# Patient Record
Sex: MALE | Race: WHITE | NOT HISPANIC OR LATINO | Employment: OTHER | URBAN - METROPOLITAN AREA
[De-identification: names, ages, dates, MRNs, and addresses within clinical notes are randomized per-mention and may not be internally consistent; named-entity substitution may affect disease eponyms.]

---

## 2019-01-11 RX ORDER — FLUTICASONE PROPIONATE 50 MCG
1 SPRAY, SUSPENSION (ML) NASAL DAILY
COMMUNITY
End: 2022-02-18

## 2019-01-11 RX ORDER — ARFORMOTEROL TARTRATE 15 UG/2ML
15 SOLUTION RESPIRATORY (INHALATION) 2 TIMES DAILY
COMMUNITY

## 2019-01-11 RX ORDER — FUROSEMIDE 20 MG/1
20 TABLET ORAL 2 TIMES DAILY
COMMUNITY

## 2019-01-11 RX ORDER — BUDESONIDE 0.5 MG/2ML
0.5 INHALANT ORAL DAILY
COMMUNITY

## 2019-01-11 RX ORDER — ALBUTEROL SULFATE 1.25 MG/3ML
1.25 SOLUTION RESPIRATORY (INHALATION) EVERY 6 HOURS PRN
COMMUNITY
End: 2020-01-15

## 2019-01-11 RX ORDER — POTASSIUM CHLORIDE 750 MG/1
10 CAPSULE, EXTENDED RELEASE ORAL DAILY
COMMUNITY

## 2019-01-14 ENCOUNTER — OFFICE VISIT (OUTPATIENT)
Dept: PULMONOLOGY | Facility: CLINIC | Age: 79
End: 2019-01-14
Payer: MEDICARE

## 2019-01-14 VITALS
WEIGHT: 196 LBS | HEART RATE: 94 BPM | HEIGHT: 68 IN | DIASTOLIC BLOOD PRESSURE: 75 MMHG | SYSTOLIC BLOOD PRESSURE: 130 MMHG | BODY MASS INDEX: 29.7 KG/M2 | OXYGEN SATURATION: 98 %

## 2019-01-14 DIAGNOSIS — J96.11 CHRONIC HYPOXEMIC RESPIRATORY FAILURE: ICD-10-CM

## 2019-01-14 DIAGNOSIS — J44.9 CHRONIC OBSTRUCTIVE PULMONARY DISEASE, UNSPECIFIED COPD TYPE: ICD-10-CM

## 2019-01-14 PROCEDURE — 99214 PR OFFICE/OUTPT VISIT, EST, LEVL IV, 30-39 MIN: ICD-10-PCS | Mod: ,,, | Performed by: INTERNAL MEDICINE

## 2019-01-14 PROCEDURE — 99214 OFFICE O/P EST MOD 30 MIN: CPT | Mod: ,,, | Performed by: INTERNAL MEDICINE

## 2019-01-14 NOTE — PATIENT INSTRUCTIONS
Chronic Lung Disease: Preventing Lung Infections  Chronic lung diseases include chronic obstructive pulmonary disease (COPD), which includes chronic bronchitis and emphysema. Other chronic lung diseases include pulmonary fibrosis, sarcoidosis, and other conditions. When you have chronic lung diseases, it's very important to protect yourself from respiratory infections, like colds, the flu, and lung infections. Infections may cause your lung condition to worsen. Although you can't completely avoid them, there are things you can do to lessen the chance of infections.    Take precautions  Taking the following precautions can help you avoid illness:  · Remember to keep your hands away from your nose and mouth. Germs on your hands get into your respiratory system this way.  · Wash your hands often. When you wash them:  ¨ Use soap and warm water.  ¨ Rub your hands together well for at least 20 seconds.  ¨ Make sure to rinse them well.  ¨ Dry your hands on clean towels or air-dry them.  · Use hand  containing alcohol, if you are unable to wash your hands. Use the  after touching doorknobs, handles, and supermarket carts, for example, since lots of people touch them. Then wash your hands as soon as you can.  · To help prevent the flu, get a flu vaccination every year. This may be given at your healthcare provider's office, a drugstore, or pharmacy, or at work. Get your flu shot as soon as the vaccines are available in your area. This is usually around September each year.  · To help prevent pneumococcal pneumonia, get pneumonia vaccinations. Talk with your healthcare provider about which pneumococcal vaccinations you need.  · Try to stay away from people with respiratory infections, such as colds or the flu. Stay away from crowded places, like shopping centers or movie theatres during cold and flu season.  · If you smoke, think about quitting. In addition to causing or worsening many lung conditions, the  lung damage from smoking increases your risk of infections. Stay away from others who smoke, too. This is also harmful and increases your chance of infections.  Date Last Reviewed: 4/14/2016  © 1531-7054 [employer]. [address] [place] [address], [place] [address]. All rights reserved. This information is not intended as a substitute for professional medical care. Always follow your healthcare professional's instructions.    Continue the Brovana, Budesonide and Incruse  Call if you get sick  PFTs before next visit   Refill Combivent, only use as needed

## 2019-01-14 NOTE — PROGRESS NOTES
SUBJECTIVE:    Patient ID: Sridhar Florentino is a 78 y.o. male.    Chief Complaint: COPD    HPI   Patient here today using Brovana and budesonide twice a day, and Incruse daily.  He is wearing his oxygen all the time.  He is also using albuterol via nebulizer 3 times a day.  He is not taking the Lasix every day because he is not having swelling anymore like he was.  He is out of his Combivent.  He is not able to do much secondary to his dyspnea.    Past Medical History:   Diagnosis Date    Chronic back pain     COPD (chronic obstructive pulmonary disease)     Hypoxemia      Past Surgical History:   Procedure Laterality Date    bilateral cataracts      LUMBAR DISC SURGERY       Family History   Problem Relation Age of Onset    Cancer Mother     Heart failure Father     Suicide Brother         Social History:   Marital Status:   Occupation:  Retired nuclear med tech   Alcohol History:  reports that he does not drink alcohol.  Tobacco History:  reports that he quit smoking about 10 years ago. His smoking use included cigarettes. He has a 40.00 pack-year smoking history. He does not have any smokeless tobacco history on file.  Drug History:  reports that he does not use drugs.    Review of patient's allergies indicates:  No Known Allergies    Current Outpatient Medications   Medication Sig Dispense Refill    albuterol (ACCUNEB) 1.25 mg/3 mL Nebu Take 1.25 mg by nebulization every 6 (six) hours as needed. Rescue      arformoterol (BROVANA) 15 mcg/2 mL Nebu Take 15 mcg by nebulization 2 (two) times daily. Controller      budesonide (PULMICORT) 0.5 mg/2 mL nebulizer solution Take 0.5 mg by nebulization once daily. Controller      fluticasone (FLONASE) 50 mcg/actuation nasal spray 1 spray by Each Nare route once daily.      furosemide (LASIX) 20 MG tablet Take 20 mg by mouth 2 (two) times daily.      ipratropium-albuterol (COMBIVENT RESPIMAT)  mcg/actuation inhaler Inhale 1 puff into the lungs  "every 6 (six) hours as needed for Wheezing. Rescue      potassium chloride (MICRO-K) 10 MEQ CpSR Take 10 mEq by mouth once.      umeclidinium (INCRUSE ELLIPTA) 62.5 mcg/actuation DsDv Inhale into the lungs. Controller      ipratropium-albuterol (COMBIVENT)  mcg/actuation inhaler Inhale 1 puff into the lungs every 6 (six) hours as needed for Wheezing. Rescue 1 Package 6     No current facility-administered medications for this visit.        Alpha-1 Antitrypsin: MM  Last PFT: 02/2018  Last CT:02/2018    Review of Systems  General: good and bad days, only has good days when the humidity is down  Eyes: Vision is good.  ENT:  No sinusitis or pharyngitis.   Heart:: chest pain or palpitations when he feels short of breath  Lungs: dyspnea with little activity, productive cough with thick discolored mucous, orthopnea   GI: No Nausea, vomiting, constipation, diarrhea, or reflux.  : No dysuria, hesitancy, or nocturia.  Musculoskeletal: No joint pain or myalgias.  Skin: No lesions or rashes.  Neuro:headaches last several mornings  Lymph: not having swelling like he was   Psych: No anxiety or depression.  Endo: weight is stable     OBJECTIVE:      /75 (BP Location: Left arm, Patient Position: Sitting)   Pulse 94   Ht 5' 8" (1.727 m)   Wt 88.9 kg (196 lb)   SpO2 98% Comment: 3lpm  BMI 29.80 kg/m²     Physical Exam  GENERAL: Older patient in no distress.  HEENT: Pupils equal and reactive. Extraocular movements intact. Nose intact.      Pharynx moist.  NECK: Supple.   HEART: Regular rate and rhythm. No murmur or gallop auscultated.  LUNGS: Clear to auscultation and percussion. Lung excursion symmetrical. No change in fremitus. No adventitial noises.  ABDOMEN: Bowel sounds present. Non-tender, no masses palpated.  EXTREMITIES: Normal muscle tone and joint movement, no cyanosis or clubbing.   LYMPHATICS: No adenopathy palpated, no edema.  SKIN: Dry, intact, no lesions.   NEURO: Cranial nerves II-XII intact. " Motor strength 5/5 bilaterally, upper and lower extremities.  PSYCH: Appropriate affect.    Assessment:       1. Chronic obstructive pulmonary disease, unspecified COPD type    2. Chronic hypoxemic respiratory failure          Plan:       Chronic obstructive pulmonary disease, unspecified COPD type    Chronic hypoxemic respiratory failure    Other orders  -     ipratropium-albuterol (COMBIVENT)  mcg/actuation inhaler; Inhale 1 puff into the lungs every 6 (six) hours as needed for Wheezing. Rescue  Dispense: 1 Package; Refill: 6      Continue the Brovana, Budesonide and Incruse  Call if you get sick  PFTs before next visit   Refill combivent, only use as needed   Follow-up in about 6 months (around 7/14/2019).

## 2019-01-23 ENCOUNTER — TELEPHONE (OUTPATIENT)
Dept: PULMONOLOGY | Facility: CLINIC | Age: 79
End: 2019-01-23

## 2019-01-23 NOTE — TELEPHONE ENCOUNTER
----- Message from Shawn Hurtado MA sent at 1/22/2019  4:42 PM CST -----  Was he set up for PFTS before his next apt

## 2019-03-18 ENCOUNTER — CLINICAL SUPPORT (OUTPATIENT)
Dept: PULMONOLOGY | Facility: CLINIC | Age: 79
End: 2019-03-18
Payer: MEDICARE

## 2019-03-18 DIAGNOSIS — Z00.8 ENCOUNTER FOR PULMONARY FUNCTION TESTING: Primary | ICD-10-CM

## 2019-03-18 PROCEDURE — 94727 PR PULM FUNCTION TEST BY GAS: ICD-10-PCS | Mod: ,,, | Performed by: INTERNAL MEDICINE

## 2019-03-18 PROCEDURE — 94060 EVALUATION OF WHEEZING: CPT | Mod: ,,, | Performed by: INTERNAL MEDICINE

## 2019-03-18 PROCEDURE — 94727 GAS DIL/WSHOT DETER LNG VOL: CPT | Mod: ,,, | Performed by: INTERNAL MEDICINE

## 2019-03-18 PROCEDURE — 94729 PR C02/MEMBANE DIFFUSE CAPACITY: ICD-10-PCS | Mod: ,,, | Performed by: INTERNAL MEDICINE

## 2019-03-18 PROCEDURE — 94729 DIFFUSING CAPACITY: CPT | Mod: ,,, | Performed by: INTERNAL MEDICINE

## 2019-03-18 PROCEDURE — 94060 PR EVAL OF BRONCHOSPASM: ICD-10-PCS | Mod: ,,, | Performed by: INTERNAL MEDICINE

## 2019-03-27 NOTE — PROGRESS NOTES
Kellie Mcgowan M.D., F.C.C.P.  Pulmonary & Critical Care Medicine    [address] [place] [address], [address]  [place] [address]  [phone]      PFT's / Spirometry Results    Patient Name: Sridhar Florentino  YOB: 1940    Date of Study: 3/18/2019    Spirometry: Very severe obstruction.    Lung Volume: Mild restriction.    Diffusion Capacity: Moderate diffusion defect.    Response to Bronchodilators: Good response to bronchodilators in the small airways.    Comments: Continued decrease in pulmonary function.        This note was electronically signed by Kellie Mcgowan MD

## 2019-06-19 ENCOUNTER — TELEPHONE (OUTPATIENT)
Dept: PULMONOLOGY | Facility: CLINIC | Age: 79
End: 2019-06-19

## 2019-07-17 ENCOUNTER — DOCUMENTATION ONLY (OUTPATIENT)
Dept: PULMONOLOGY | Facility: CLINIC | Age: 79
End: 2019-07-17

## 2019-07-17 ENCOUNTER — OFFICE VISIT (OUTPATIENT)
Dept: PULMONOLOGY | Facility: CLINIC | Age: 79
End: 2019-07-17
Payer: MEDICARE

## 2019-07-17 VITALS
SYSTOLIC BLOOD PRESSURE: 120 MMHG | OXYGEN SATURATION: 95 % | DIASTOLIC BLOOD PRESSURE: 70 MMHG | HEIGHT: 68 IN | HEART RATE: 95 BPM | WEIGHT: 196 LBS | BODY MASS INDEX: 29.7 KG/M2

## 2019-07-17 DIAGNOSIS — J96.11 CHRONIC HYPOXEMIC RESPIRATORY FAILURE: ICD-10-CM

## 2019-07-17 DIAGNOSIS — J44.9 CHRONIC OBSTRUCTIVE PULMONARY DISEASE, UNSPECIFIED COPD TYPE: Primary | ICD-10-CM

## 2019-07-17 PROCEDURE — 99214 OFFICE O/P EST MOD 30 MIN: CPT | Mod: ,,, | Performed by: NURSE PRACTITIONER

## 2019-07-17 PROCEDURE — 99214 PR OFFICE/OUTPT VISIT, EST, LEVL IV, 30-39 MIN: ICD-10-PCS | Mod: ,,, | Performed by: NURSE PRACTITIONER

## 2019-07-17 RX ORDER — HYDROCODONE BITARTRATE AND ACETAMINOPHEN 7.5; 325 MG/1; MG/1
TABLET ORAL
Refills: 0 | COMMUNITY
Start: 2019-06-14 | End: 2022-02-18

## 2019-07-17 NOTE — PROGRESS NOTES
SUBJECTIVE:    Patient ID: Sridhar Florentino is a 79 y.o. male.    Chief Complaint: COPD (needs combivent Rx)    HPI   Patient here today using Brovana and budesonide twice a day, and Incruse daily.  He is wearing his oxygen all the time.  He is using Combivent as needed.    He has been suffering from nose bleeds.  He is using humidity on his oxygen at home.  He is not taking his Lasix daily because it makes him urinate all day.   Past Medical History:   Diagnosis Date    Chronic back pain     COPD (chronic obstructive pulmonary disease)     Hypoxemia      Past Surgical History:   Procedure Laterality Date    bilateral cataracts      LUMBAR DISC SURGERY       Family History   Problem Relation Age of Onset    Cancer Mother     Heart failure Father     Suicide Brother         Social History:   Marital Status:   Occupation:  Retired nuclear med tech   Alcohol History:  reports that he does not drink alcohol.  Tobacco History:  reports that he quit smoking about 10 years ago. His smoking use included cigarettes. He has a 40.00 pack-year smoking history. He has never used smokeless tobacco.  Drug History:  reports that he does not use drugs.    Review of patient's allergies indicates:  No Known Allergies    Current Outpatient Medications   Medication Sig Dispense Refill    albuterol (ACCUNEB) 1.25 mg/3 mL Nebu Take 1.25 mg by nebulization every 6 (six) hours as needed. Rescue      arformoterol (BROVANA) 15 mcg/2 mL Nebu Take 15 mcg by nebulization 2 (two) times daily. Controller      budesonide (PULMICORT) 0.5 mg/2 mL nebulizer solution Take 0.5 mg by nebulization once daily. Controller      fluticasone (FLONASE) 50 mcg/actuation nasal spray 1 spray by Each Nare route once daily.      furosemide (LASIX) 20 MG tablet Take 20 mg by mouth 2 (two) times daily.      HYDROcodone-acetaminophen (NORCO) 7.5-325 mg per tablet TK 1 T PO Q 4 TO 6 H PRN  0    ipratropium-albuterol (COMBIVENT)  mcg/actuation  "inhaler Inhale 1 puff into the lungs every 6 (six) hours as needed for Wheezing. Rescue 1 g 6    potassium chloride (MICRO-K) 10 MEQ CpSR Take 10 mEq by mouth once.      umeclidinium (INCRUSE ELLIPTA) 62.5 mcg/actuation DsDv Inhale into the lungs. Controller       No current facility-administered medications for this visit.        Alpha-1 Antitrypsin: MM  Last PFT: 03/2019 Very severe obstruction, mild restriction, moderate diffusion defect  Last CT:02/2018    Review of Systems  General: good and bad days  Eyes: Vision is good.  ENT:  Nose bleeds   Heart:: palpitations   Lungs: dyspnea with little activity, orthopnea   GI: No Nausea, vomiting, constipation, diarrhea, or reflux.  : No dysuria, hesitancy, or nocturia.  Musculoskeletal: No joint pain or myalgias.  Skin: No lesions or rashes.  Neuro:headaches occasionally, tremor  Lymph: swelling to legs   Psych: No anxiety or depression.  Endo: weight is stable     OBJECTIVE:      /70 (BP Location: Left arm, Patient Position: Sitting, BP Method: Medium (Manual))   Pulse 95   Ht 5' 8" (1.727 m)   Wt 88.9 kg (196 lb)   SpO2 95% Comment: 3LPM PD  BMI 29.80 kg/m²     Physical Exam  GENERAL: Older patient in no distress.  HEENT: Pupils equal and reactive. Extraocular movements intact. Nose intact.      Pharynx moist.  NECK: Supple.   HEART: Regular rate and rhythm. No murmur or gallop auscultated.  LUNGS: crackles to bases . Lung excursion symmetrical. No change in fremitus. No adventitial noises.  ABDOMEN: Bowel sounds present. Non-tender, no masses palpated.  EXTREMITIES: Normal muscle tone and joint movement, no cyanosis or clubbing.   LYMPHATICS: 2+ pitting edema to legs  SKIN: Dry, intact, no lesions.   NEURO: Cranial nerves II-XII intact. Motor strength 5/5 bilaterally, upper and lower extremities.  PSYCH: Appropriate affect.    Assessment:       1. Chronic obstructive pulmonary disease, unspecified COPD type    2. Chronic hypoxemic respiratory failure  "         Plan:       Chronic obstructive pulmonary disease, unspecified COPD type  -     VARIABLE CONCENTRATION MASK FOR HOME USE    Chronic hypoxemic respiratory failure      Continue the Brovana, Budesonide and Incruse  Needs Oxymask   Elevate feet  Take your Lasix  Chest xray   Flu shot in the fall   See Dr. Lou regarding tremor   Follow up in about 6 months (around 1/17/2020).

## 2019-07-17 NOTE — PATIENT INSTRUCTIONS
Treatment for COPD    Your healthcare provider will prescribe the best treatments for your COPD.  Treatment  Recommendations include the following:  · Medicines. Some medicines help relieve symptoms when you have them. Others are taken daily to control inflammation in the lungs. Always take your medicines as prescribed. Learn the names of your medicines, as well as how and when to use them.  · Oxygen therapy. Oxygen may be prescribed if tests show that your blood contains too little oxygen.  · Smoking. If you smoke, quit. Smoking is the main cause of COPD. Quitting will help you be able to better manage your COPD. Ask your healthcare provider about ways to help you quit smoking.  · Avoiding infections. Infections, like a cold or the flu, can cause your symptoms to worsen. Try to stay away from people who are sick. Wash your hands often. And, ask your healthcare provider about vaccines for the flu and pneumonia.  Coping with shortness of breath  Coping tips include the following:  · Exercise. Try to be as active as possible. This will improve energy levels and strengthen your muscles, so you can do more.  · Breathing techniques. Ask your healthcare provider or nurse to show you how to do pursed-lip breathing.  · Balance rest and activity. Each day, try to balance rest periods with activity. For example, you might start the day with getting dressed and eating breakfast, then relax and read the paper. After that, take a brief walk. And then sit with your feet up for a while.  · Pulmonary rehabilitation. Ask your provider, or call your local hospital to find out about pulmonary rehab programs. The programs help with managing your disease, breathing techniques, exercise, support and counseling.  · Healthy eating. Eating a healthy, balanced diet and making an effort to maintain your ideal weight are important to staying as healthy as possible. Make sure you have a lot of fruit and vegetables every day, as well as  balanced portions of whole grains, lean meats and fish, and low-fat dairy products.  Date Last Reviewed: 5/1/2016  © 3699-4501 [employer]. [address] [place] [address], [place] [address]. All rights reserved. This information is not intended as a substitute for professional medical care. Always follow your healthcare professional's instructions.    Continue the Brovana, Budesonide and Incruse  Needs Oxymask   Elevate feet  Take your Lasix

## 2019-08-06 ENCOUNTER — HOSPITAL ENCOUNTER (OUTPATIENT)
Dept: RADIOLOGY | Facility: HOSPITAL | Age: 79
Discharge: HOME | End: 2019-08-06
Attending: NURSE PRACTITIONER
Payer: MEDICARE

## 2019-08-06 ENCOUNTER — TELEPHONE (OUTPATIENT)
Dept: PULMONOLOGY | Facility: CLINIC | Age: 79
End: 2019-08-06

## 2019-08-06 DIAGNOSIS — J44.9 CHRONIC OBSTRUCTIVE PULMONARY DISEASE, UNSPECIFIED COPD TYPE: ICD-10-CM

## 2019-08-06 DIAGNOSIS — J44.9 CHRONIC OBSTRUCTIVE PULMONARY DISEASE, UNSPECIFIED COPD TYPE: Primary | ICD-10-CM

## 2019-08-06 PROCEDURE — 71046 X-RAY EXAM CHEST 2 VIEWS: CPT | Mod: TC

## 2019-08-06 NOTE — TELEPHONE ENCOUNTER
Pt wants home health someone to help him around and wants help to breathe better. Would like to discuss this with you and he said he does not want to go to his PCP.

## 2019-08-23 ENCOUNTER — TELEPHONE (OUTPATIENT)
Dept: PULMONOLOGY | Facility: CLINIC | Age: 79
End: 2019-08-23

## 2019-10-30 PROBLEM — Z99.81 OXYGEN DEPENDENT: Status: ACTIVE | Noted: 2018-02-05

## 2019-10-30 PROBLEM — J84.10 GRANULOMATOUS LUNG DISEASE: Status: ACTIVE | Noted: 2019-09-03

## 2019-10-30 PROBLEM — M51.36 DDD (DEGENERATIVE DISC DISEASE), LUMBAR: Status: ACTIVE | Noted: 2018-02-05

## 2019-10-30 PROBLEM — I87.2 VENOUS INSUFFICIENCY OF BOTH LOWER EXTREMITIES: Status: ACTIVE | Noted: 2018-02-05

## 2019-10-30 PROBLEM — Z87.891 PERSONAL HISTORY OF NICOTINE DEPENDENCE: Status: ACTIVE | Noted: 2018-02-05

## 2019-10-31 ENCOUNTER — TELEPHONE (OUTPATIENT)
Dept: FAMILY MEDICINE | Facility: CLINIC | Age: 79
End: 2019-10-31

## 2019-10-31 NOTE — TELEPHONE ENCOUNTER
"SPOKE WITH PT STATES FEELING SOB YESTERDAY AND TODAY. "I FEEL LIKE DEATH WARMED OVER." NO FEVER NO OTHER SXS.  STATES FEET EDEMA X2 DAYS.  PT DID NOT SPECIFY IF THIS IS NORMAL OR NOT.  TO DR NEAL FOR REVIEW. -DN  "

## 2019-10-31 NOTE — TELEPHONE ENCOUNTER
----- Message from Libby Vaz sent at 10/30/2019  8:55 AM CDT -----  Just an FYI patient cancelled appointment this morning because he is too weak to move . Please give him a call

## 2020-01-15 ENCOUNTER — OFFICE VISIT (OUTPATIENT)
Dept: PULMONOLOGY | Facility: CLINIC | Age: 80
End: 2020-01-15
Payer: MEDICARE

## 2020-01-15 ENCOUNTER — TELEPHONE (OUTPATIENT)
Dept: PULMONOLOGY | Facility: CLINIC | Age: 80
End: 2020-01-15

## 2020-01-15 VITALS
SYSTOLIC BLOOD PRESSURE: 145 MMHG | HEART RATE: 89 BPM | DIASTOLIC BLOOD PRESSURE: 80 MMHG | BODY MASS INDEX: 29.5 KG/M2 | WEIGHT: 194 LBS | OXYGEN SATURATION: 91 %

## 2020-01-15 DIAGNOSIS — J96.11 CHRONIC HYPOXEMIC RESPIRATORY FAILURE: ICD-10-CM

## 2020-01-15 DIAGNOSIS — J44.9 CHRONIC OBSTRUCTIVE PULMONARY DISEASE, UNSPECIFIED COPD TYPE: Primary | ICD-10-CM

## 2020-01-15 PROCEDURE — 1125F PR PAIN SEVERITY QUANTIFIED, PAIN PRESENT: ICD-10-PCS | Mod: S$GLB,,, | Performed by: INTERNAL MEDICINE

## 2020-01-15 PROCEDURE — 1159F MED LIST DOCD IN RCRD: CPT | Mod: S$GLB,,, | Performed by: INTERNAL MEDICINE

## 2020-01-15 PROCEDURE — 99214 OFFICE O/P EST MOD 30 MIN: CPT | Mod: S$GLB,,, | Performed by: INTERNAL MEDICINE

## 2020-01-15 PROCEDURE — 99214 PR OFFICE/OUTPT VISIT, EST, LEVL IV, 30-39 MIN: ICD-10-PCS | Mod: S$GLB,,, | Performed by: INTERNAL MEDICINE

## 2020-01-15 PROCEDURE — 1159F PR MEDICATION LIST DOCUMENTED IN MEDICAL RECORD: ICD-10-PCS | Mod: S$GLB,,, | Performed by: INTERNAL MEDICINE

## 2020-01-15 PROCEDURE — 1125F AMNT PAIN NOTED PAIN PRSNT: CPT | Mod: S$GLB,,, | Performed by: INTERNAL MEDICINE

## 2020-01-15 RX ORDER — IPRATROPIUM BROMIDE AND ALBUTEROL SULFATE 2.5; .5 MG/3ML; MG/3ML
3 SOLUTION RESPIRATORY (INHALATION) EVERY 6 HOURS PRN
Qty: 120 VIAL | Refills: 5 | Status: SHIPPED | OUTPATIENT
Start: 2020-01-15 | End: 2021-01-14

## 2020-01-15 NOTE — PROGRESS NOTES
SUBJECTIVE:    Patient ID: Sridhar Florentino is a 79 y.o. male.    Chief Complaint: COPD (6 mo check up )    COPD        The patient is here with his breathing not doing well.  He is using Brovana and Budesonide and albuterol.  He stopped the Incruse as he did not feel benefit.  He is not using his Lasix because it makes him urinate too much.   Past Medical History:   Diagnosis Date    Chronic back pain     COPD (chronic obstructive pulmonary disease)     Hypoxemia      Past Surgical History:   Procedure Laterality Date    bilateral cataracts      LUMBAR DISC SURGERY       Family History   Problem Relation Age of Onset    Cancer Mother     Heart failure Father     Suicide Brother         Social History:   Marital Status:   Occupation:  Retired nuclear med tech   Alcohol History:  reports that he does not drink alcohol.  Tobacco History:  reports that he quit smoking about 11 years ago. His smoking use included cigarettes. He has a 40.00 pack-year smoking history. He has never used smokeless tobacco.  Drug History:  reports that he does not use drugs.    Review of patient's allergies indicates:  No Known Allergies    Current Outpatient Medications   Medication Sig Dispense Refill    arformoterol (BROVANA) 15 mcg/2 mL Nebu Take 15 mcg by nebulization 2 (two) times daily. Controller      budesonide (PULMICORT) 0.5 mg/2 mL nebulizer solution Take 0.5 mg by nebulization once daily. Controller      fluticasone (FLONASE) 50 mcg/actuation nasal spray 1 spray by Each Nare route once daily.      HYDROcodone-acetaminophen (NORCO) 7.5-325 mg per tablet TK 1 T PO Q 4 TO 6 H PRN  0    ipratropium-albuterol (COMBIVENT)  mcg/actuation inhaler Inhale 1 puff into the lungs every 6 (six) hours as needed for Wheezing. Rescue 3 Package 3    potassium chloride (MICRO-K) 10 MEQ CpSR Take 10 mEq by mouth once.      albuterol-ipratropium (DUO-NEB) 2.5 mg-0.5 mg/3 mL nebulizer solution Take 3 mLs by nebulization  every 6 (six) hours as needed for Wheezing. Rescue 120 vial 5    furosemide (LASIX) 20 MG tablet Take 20 mg by mouth 2 (two) times daily.      ipratropium-albuterol (COMBIVENT)  mcg/actuation inhaler Inhale 1 puff into the lungs every 6 (six) hours as needed for Wheezing. Rescue 1 g 6    umeclidinium (INCRUSE ELLIPTA) 62.5 mcg/actuation DsDv Inhale into the lungs. Controller       No current facility-administered medications for this visit.        Alpha-1 Antitrypsin: MM  Last PFT: 03/2019 Very severe obstruction, mild restriction, moderate diffusion defect  Last CT:02/2018    Review of Systems  General: good and bad days  Eyes: Vision is good.  ENT: epistaxis with afrin  Heart:: palpitations  Have resolved  Lungs: dyspnea with little activity, orthopnea   GI: diarrhea up to 3-4 times a day  : No dysuria, hesitancy, or nocturia.  Musculoskeletal: No joint pain or myalgias.  Skin: No lesions or rashes.  Neuro:headaches off and on, tremors  Lymph: swelling to legs   Psych: No anxiety or depression.  Endo: weight is stable     OBJECTIVE:      BP (!) 145/80 (BP Location: Left arm, Patient Position: Sitting)   Pulse 89   Wt 88 kg (194 lb)   SpO2 (!) 91% Comment: 3.0 oxygen level  BMI 29.50 kg/m²     Physical Exam  GENERAL: Older patient in no distress.  HEENT: Pupils equal and reactive. Extraocular movements intact. Nose intact. Pharynx moist.  NECK: Supple.   HEART: Regular rate and rhythm. No murmur or gallop auscultated.  LUNGS:  There are expiratory wheezes bilaterally.  The expiratory phase is twice as long as the inspiratory phase.  Overall breath sounds are diminished.. Lung excursion symmetrical. No change in fremitus.   ABDOMEN: Bowel sounds present. Non-tender, no masses palpated.  EXTREMITIES: Normal muscle tone and joint movement, no cyanosis or clubbing.   LYMPHATICS: 2+ pitting edema to legs  SKIN: Dry, intact, no lesions.   NEURO: Cranial nerves II-XII intact. Motor strength 5/5 bilaterally,  upper and lower extremities.  PSYCH:  Depressed affect    Assessment:       1. Chronic obstructive pulmonary disease, unspecified COPD type    2. Chronic hypoxemic respiratory failure          Plan:       Chronic obstructive pulmonary disease, unspecified COPD type  -     albuterol-ipratropium (DUO-NEB) 2.5 mg-0.5 mg/3 mL nebulizer solution; Take 3 mLs by nebulization every 6 (six) hours as needed for Wheezing. Rescue  Dispense: 120 vial; Refill: 5    Chronic hypoxemic respiratory failure      Can't take Daliresp secondary to diarrhea  Change albuterol to duoneb  Continue Brovana and budesonide  Don't double up on duoneb and combivent  Continue continuous oxygen  Follow up in about 6 months (around 7/15/2020).

## 2020-01-15 NOTE — TELEPHONE ENCOUNTER
----- Message from Shawn Hurtado MA sent at 1/15/2020  2:12 PM CST -----  PTs [facility]

## 2020-01-20 RX ORDER — ARFORMOTEROL TARTRATE 15 UG/2ML
15 SOLUTION RESPIRATORY (INHALATION) 2 TIMES DAILY
Qty: 180 VIAL | Refills: 6 | Status: SHIPPED | OUTPATIENT
Start: 2020-01-20 | End: 2021-01-19

## 2020-01-20 RX ORDER — BUDESONIDE 0.5 MG/2ML
0.5 INHALANT ORAL 2 TIMES DAILY
Qty: 120 ML | Refills: 6 | Status: SHIPPED | OUTPATIENT
Start: 2020-01-20 | End: 2021-01-19

## 2022-02-17 ENCOUNTER — HOSPITAL ENCOUNTER (OUTPATIENT)
Facility: HOSPITAL | Age: 82
Discharge: HOME | End: 2022-02-18
Attending: EMERGENCY MEDICINE | Admitting: STUDENT IN AN ORGANIZED HEALTH CARE EDUCATION/TRAINING PROGRAM
Payer: MEDICARE

## 2022-02-17 DIAGNOSIS — R06.02 SHORTNESS OF BREATH: ICD-10-CM

## 2022-02-17 DIAGNOSIS — J44.1 COPD EXACERBATION: Primary | ICD-10-CM

## 2022-02-17 LAB
ALBUMIN SERPL BCP-MCNC: 3.8 G/DL (ref 3.5–5.2)
ALP SERPL-CCNC: 39 U/L (ref 55–135)
ALT SERPL W/O P-5'-P-CCNC: 13 U/L (ref 10–44)
AMPHET+METHAMPHET UR QL: NEGATIVE
ANION GAP SERPL CALC-SCNC: 11 MMOL/L (ref 8–16)
APTT PPP: 30.3 SEC (ref 23.3–35.1)
AST SERPL-CCNC: 30 U/L (ref 10–40)
BACTERIA #/AREA URNS HPF: NEGATIVE /HPF
BARBITURATES UR QL SCN>200 NG/ML: NEGATIVE
BASOPHILS # BLD AUTO: 0.02 K/UL (ref 0–0.2)
BASOPHILS NFR BLD: 0.2 % (ref 0–1.9)
BENZODIAZ UR QL SCN>200 NG/ML: NEGATIVE
BILIRUB SERPL-MCNC: 0.6 MG/DL (ref 0.1–1)
BILIRUB UR QL STRIP: NEGATIVE
BNP SERPL-MCNC: 24 PG/ML (ref 0–99)
BUN SERPL-MCNC: 13 MG/DL (ref 8–23)
BZE UR QL SCN: NEGATIVE
CALCIUM SERPL-MCNC: 8.4 MG/DL (ref 8.7–10.5)
CANNABINOIDS UR QL SCN: NEGATIVE
CHLORIDE SERPL-SCNC: 100 MMOL/L (ref 95–110)
CK SERPL-CCNC: 59 U/L (ref 20–200)
CLARITY UR: CLEAR
CO2 SERPL-SCNC: 34 MMOL/L (ref 23–29)
COLOR UR: YELLOW
CREAT SERPL-MCNC: 0.6 MG/DL (ref 0.5–1.4)
CREAT UR-MCNC: 233 MG/DL (ref 23–375)
DIFFERENTIAL METHOD: ABNORMAL
EOSINOPHIL # BLD AUTO: 0.3 K/UL (ref 0–0.5)
EOSINOPHIL NFR BLD: 2.1 % (ref 0–8)
ERYTHROCYTE [DISTWIDTH] IN BLOOD BY AUTOMATED COUNT: 16.9 % (ref 11.5–14.5)
EST. GFR  (AFRICAN AMERICAN): >60 ML/MIN/1.73 M^2
EST. GFR  (NON AFRICAN AMERICAN): >60 ML/MIN/1.73 M^2
GLUCOSE SERPL-MCNC: 137 MG/DL (ref 70–110)
GLUCOSE UR QL STRIP: NEGATIVE
HCT VFR BLD AUTO: 32.6 % (ref 40–54)
HGB BLD-MCNC: 9.7 G/DL (ref 14–18)
HGB UR QL STRIP: ABNORMAL
HYALINE CASTS #/AREA URNS LPF: 5 /LPF
IMM GRANULOCYTES # BLD AUTO: 0.09 K/UL (ref 0–0.04)
IMM GRANULOCYTES NFR BLD AUTO: 0.7 % (ref 0–0.5)
INFLUENZA A, MOLECULAR: NEGATIVE
INFLUENZA B, MOLECULAR: NEGATIVE
INR PPP: 1.1
KETONES UR QL STRIP: NEGATIVE
LACTATE SERPL-SCNC: 1.3 MMOL/L (ref 0.5–1.9)
LEUKOCYTE ESTERASE UR QL STRIP: NEGATIVE
LYMPHOCYTES # BLD AUTO: 2.5 K/UL (ref 1–4.8)
LYMPHOCYTES NFR BLD: 20.5 % (ref 18–48)
MAGNESIUM SERPL-MCNC: 1.9 MG/DL (ref 1.6–2.6)
MCH RBC QN AUTO: 28.6 PG (ref 27–31)
MCHC RBC AUTO-ENTMCNC: 29.8 G/DL (ref 32–36)
MCV RBC AUTO: 96 FL (ref 82–98)
MICROSCOPIC COMMENT: ABNORMAL
MONOCYTES # BLD AUTO: 0.7 K/UL (ref 0.3–1)
MONOCYTES NFR BLD: 5.8 % (ref 4–15)
NEUTROPHILS # BLD AUTO: 8.6 K/UL (ref 1.8–7.7)
NEUTROPHILS NFR BLD: 70.7 % (ref 38–73)
NITRITE UR QL STRIP: NEGATIVE
NRBC BLD-RTO: 0 /100 WBC
OPIATES UR QL SCN: NEGATIVE
PCP UR QL SCN>25 NG/ML: NEGATIVE
PH UR STRIP: 6 [PH] (ref 5–8)
PLATELET # BLD AUTO: 234 K/UL (ref 150–450)
PMV BLD AUTO: 11 FL (ref 9.2–12.9)
POTASSIUM SERPL-SCNC: 4.2 MMOL/L (ref 3.5–5.1)
PROCALCITONIN SERPL IA-MCNC: <0.05 NG/ML (ref 0–0.5)
PROT SERPL-MCNC: 6.7 G/DL (ref 6–8.4)
PROT UR QL STRIP: ABNORMAL
PROTHROMBIN TIME: 13.3 SEC (ref 11.4–13.7)
RBC # BLD AUTO: 3.39 M/UL (ref 4.6–6.2)
RBC #/AREA URNS HPF: 10 /HPF (ref 0–4)
SARS-COV-2 RDRP RESP QL NAA+PROBE: NEGATIVE
SODIUM SERPL-SCNC: 145 MMOL/L (ref 136–145)
SP GR UR STRIP: 1.03 (ref 1–1.03)
SPECIMEN SOURCE: NORMAL
SQUAMOUS #/AREA URNS HPF: 2 /HPF
TOXICOLOGY INFORMATION: NORMAL
TROPONIN I SERPL DL<=0.01 NG/ML-MCNC: <0.03 NG/ML
URN SPEC COLLECT METH UR: ABNORMAL
UROBILINOGEN UR STRIP-ACNC: ABNORMAL EU/DL
WBC # BLD AUTO: 12.17 K/UL (ref 3.9–12.7)
WBC #/AREA URNS HPF: 7 /HPF (ref 0–5)

## 2022-02-17 PROCEDURE — 93005 ELECTROCARDIOGRAM TRACING: CPT | Performed by: SPECIALIST

## 2022-02-17 PROCEDURE — 99900031 HC PATIENT EDUCATION (STAT)

## 2022-02-17 PROCEDURE — 84145 PROCALCITONIN (PCT): CPT | Performed by: EMERGENCY MEDICINE

## 2022-02-17 PROCEDURE — 63600175 PHARM REV CODE 636 W HCPCS: Performed by: EMERGENCY MEDICINE

## 2022-02-17 PROCEDURE — 36415 COLL VENOUS BLD VENIPUNCTURE: CPT | Performed by: EMERGENCY MEDICINE

## 2022-02-17 PROCEDURE — 85730 THROMBOPLASTIN TIME PARTIAL: CPT | Performed by: EMERGENCY MEDICINE

## 2022-02-17 PROCEDURE — 84484 ASSAY OF TROPONIN QUANT: CPT | Performed by: NURSE PRACTITIONER

## 2022-02-17 PROCEDURE — 85610 PROTHROMBIN TIME: CPT | Performed by: EMERGENCY MEDICINE

## 2022-02-17 PROCEDURE — 82550 ASSAY OF CK (CPK): CPT | Performed by: NURSE PRACTITIONER

## 2022-02-17 PROCEDURE — 83880 ASSAY OF NATRIURETIC PEPTIDE: CPT | Performed by: NURSE PRACTITIONER

## 2022-02-17 PROCEDURE — 80053 COMPREHEN METABOLIC PANEL: CPT | Performed by: NURSE PRACTITIONER

## 2022-02-17 PROCEDURE — 83735 ASSAY OF MAGNESIUM: CPT | Performed by: NURSE PRACTITIONER

## 2022-02-17 PROCEDURE — 87040 BLOOD CULTURE FOR BACTERIA: CPT | Mod: 59 | Performed by: EMERGENCY MEDICINE

## 2022-02-17 PROCEDURE — 94640 AIRWAY INHALATION TREATMENT: CPT

## 2022-02-17 PROCEDURE — 99291 CRITICAL CARE FIRST HOUR: CPT | Mod: 25,CS

## 2022-02-17 PROCEDURE — 87502 INFLUENZA DNA AMP PROBE: CPT | Performed by: EMERGENCY MEDICINE

## 2022-02-17 PROCEDURE — 83605 ASSAY OF LACTIC ACID: CPT | Performed by: EMERGENCY MEDICINE

## 2022-02-17 PROCEDURE — 96375 TX/PRO/DX INJ NEW DRUG ADDON: CPT

## 2022-02-17 PROCEDURE — 94761 N-INVAS EAR/PLS OXIMETRY MLT: CPT

## 2022-02-17 PROCEDURE — 93010 ELECTROCARDIOGRAM REPORT: CPT | Mod: ,,, | Performed by: SPECIALIST

## 2022-02-17 PROCEDURE — 80307 DRUG TEST PRSMV CHEM ANLYZR: CPT | Performed by: EMERGENCY MEDICINE

## 2022-02-17 PROCEDURE — 93010 EKG 12-LEAD: ICD-10-PCS | Mod: ,,, | Performed by: SPECIALIST

## 2022-02-17 PROCEDURE — 81001 URINALYSIS AUTO W/SCOPE: CPT | Mod: 59 | Performed by: EMERGENCY MEDICINE

## 2022-02-17 PROCEDURE — 27000221 HC OXYGEN, UP TO 24 HOURS

## 2022-02-17 PROCEDURE — 85025 COMPLETE CBC W/AUTO DIFF WBC: CPT | Performed by: NURSE PRACTITIONER

## 2022-02-17 PROCEDURE — 36415 COLL VENOUS BLD VENIPUNCTURE: CPT | Performed by: NURSE PRACTITIONER

## 2022-02-17 PROCEDURE — 25000242 PHARM REV CODE 250 ALT 637 W/ HCPCS: Performed by: EMERGENCY MEDICINE

## 2022-02-17 PROCEDURE — U0002 COVID-19 LAB TEST NON-CDC: HCPCS | Performed by: NURSE PRACTITIONER

## 2022-02-17 PROCEDURE — 99900035 HC TECH TIME PER 15 MIN (STAT)

## 2022-02-17 RX ORDER — LEVALBUTEROL 1.25 MG/.5ML
1.25 SOLUTION, CONCENTRATE RESPIRATORY (INHALATION)
Status: COMPLETED | OUTPATIENT
Start: 2022-02-17 | End: 2022-02-17

## 2022-02-17 RX ORDER — BUDESONIDE 0.5 MG/2ML
0.5 INHALANT ORAL ONCE
Status: COMPLETED | OUTPATIENT
Start: 2022-02-17 | End: 2022-02-17

## 2022-02-17 RX ORDER — IPRATROPIUM BROMIDE 0.5 MG/2.5ML
0.5 SOLUTION RESPIRATORY (INHALATION) ONCE
Status: COMPLETED | OUTPATIENT
Start: 2022-02-17 | End: 2022-02-17

## 2022-02-17 RX ORDER — METHYLPREDNISOLONE SOD SUCC 125 MG
125 VIAL (EA) INJECTION
Status: COMPLETED | OUTPATIENT
Start: 2022-02-17 | End: 2022-02-17

## 2022-02-17 RX ORDER — LEVOFLOXACIN 5 MG/ML
500 INJECTION, SOLUTION INTRAVENOUS
Status: DISCONTINUED | OUTPATIENT
Start: 2022-02-18 | End: 2022-02-18 | Stop reason: HOSPADM

## 2022-02-17 RX ORDER — LEVALBUTEROL 1.25 MG/.5ML
1.25 SOLUTION, CONCENTRATE RESPIRATORY (INHALATION)
Status: COMPLETED | OUTPATIENT
Start: 2022-02-17 | End: 2022-02-18

## 2022-02-17 RX ADMIN — METHYLPREDNISOLONE SODIUM SUCCINATE 125 MG: 125 INJECTION, POWDER, FOR SOLUTION INTRAMUSCULAR; INTRAVENOUS at 08:02

## 2022-02-17 RX ADMIN — BUDESONIDE 0.5 MG: 0.5 INHALANT RESPIRATORY (INHALATION) at 08:02

## 2022-02-17 RX ADMIN — LEVALBUTEROL HYDROCHLORIDE 1.25 MG: 1.25 SOLUTION, CONCENTRATE RESPIRATORY (INHALATION) at 08:02

## 2022-02-17 RX ADMIN — IPRATROPIUM BROMIDE 0.5 MG: 0.5 SOLUTION RESPIRATORY (INHALATION) at 08:02

## 2022-02-18 VITALS
HEIGHT: 69 IN | BODY MASS INDEX: 27.75 KG/M2 | RESPIRATION RATE: 20 BRPM | DIASTOLIC BLOOD PRESSURE: 65 MMHG | HEART RATE: 105 BPM | OXYGEN SATURATION: 95 % | TEMPERATURE: 98 F | WEIGHT: 187.38 LBS | SYSTOLIC BLOOD PRESSURE: 159 MMHG

## 2022-02-18 PROBLEM — J44.1 COPD EXACERBATION: Status: RESOLVED | Noted: 2019-01-14 | Resolved: 2022-02-18

## 2022-02-18 PROBLEM — J44.1 COPD EXACERBATION: Status: ACTIVE | Noted: 2019-01-14

## 2022-02-18 LAB
ALBUMIN SERPL BCP-MCNC: 3.7 G/DL (ref 3.5–5.2)
ALP SERPL-CCNC: 39 U/L (ref 55–135)
ALT SERPL W/O P-5'-P-CCNC: 15 U/L (ref 10–44)
ANION GAP SERPL CALC-SCNC: 7 MMOL/L (ref 8–16)
AST SERPL-CCNC: 21 U/L (ref 10–40)
BASOPHILS # BLD AUTO: 0.01 K/UL (ref 0–0.2)
BASOPHILS NFR BLD: 0.1 % (ref 0–1.9)
BILIRUB SERPL-MCNC: 0.8 MG/DL (ref 0.1–1)
BUN SERPL-MCNC: 17 MG/DL (ref 8–23)
CALCIUM SERPL-MCNC: 8.8 MG/DL (ref 8.7–10.5)
CHLORIDE SERPL-SCNC: 96 MMOL/L (ref 95–110)
CO2 SERPL-SCNC: 38 MMOL/L (ref 23–29)
CREAT SERPL-MCNC: 0.8 MG/DL (ref 0.5–1.4)
DIFFERENTIAL METHOD: ABNORMAL
EOSINOPHIL # BLD AUTO: 0 K/UL (ref 0–0.5)
EOSINOPHIL NFR BLD: 0 % (ref 0–8)
ERYTHROCYTE [DISTWIDTH] IN BLOOD BY AUTOMATED COUNT: 16.4 % (ref 11.5–14.5)
EST. GFR  (AFRICAN AMERICAN): >60 ML/MIN/1.73 M^2
EST. GFR  (NON AFRICAN AMERICAN): >60 ML/MIN/1.73 M^2
GLUCOSE SERPL-MCNC: 160 MG/DL (ref 70–110)
HCT VFR BLD AUTO: 30.9 % (ref 40–54)
HGB BLD-MCNC: 9.3 G/DL (ref 14–18)
IMM GRANULOCYTES # BLD AUTO: 0.07 K/UL (ref 0–0.04)
IMM GRANULOCYTES NFR BLD AUTO: 0.6 % (ref 0–0.5)
LYMPHOCYTES # BLD AUTO: 1.3 K/UL (ref 1–4.8)
LYMPHOCYTES NFR BLD: 12.3 % (ref 18–48)
MCH RBC QN AUTO: 28.6 PG (ref 27–31)
MCHC RBC AUTO-ENTMCNC: 30.1 G/DL (ref 32–36)
MCV RBC AUTO: 95 FL (ref 82–98)
MONOCYTES # BLD AUTO: 0.1 K/UL (ref 0.3–1)
MONOCYTES NFR BLD: 0.7 % (ref 4–15)
NEUTROPHILS # BLD AUTO: 9.3 K/UL (ref 1.8–7.7)
NEUTROPHILS NFR BLD: 86.3 % (ref 38–73)
NRBC BLD-RTO: 0 /100 WBC
PLATELET # BLD AUTO: 240 K/UL (ref 150–450)
PMV BLD AUTO: 11.3 FL (ref 9.2–12.9)
POTASSIUM SERPL-SCNC: 4 MMOL/L (ref 3.5–5.1)
PROT SERPL-MCNC: 6.9 G/DL (ref 6–8.4)
RBC # BLD AUTO: 3.25 M/UL (ref 4.6–6.2)
SODIUM SERPL-SCNC: 141 MMOL/L (ref 136–145)
WBC # BLD AUTO: 10.77 K/UL (ref 3.9–12.7)

## 2022-02-18 PROCEDURE — 80053 COMPREHEN METABOLIC PANEL: CPT | Performed by: STUDENT IN AN ORGANIZED HEALTH CARE EDUCATION/TRAINING PROGRAM

## 2022-02-18 PROCEDURE — 63600175 PHARM REV CODE 636 W HCPCS: Performed by: STUDENT IN AN ORGANIZED HEALTH CARE EDUCATION/TRAINING PROGRAM

## 2022-02-18 PROCEDURE — 27000221 HC OXYGEN, UP TO 24 HOURS

## 2022-02-18 PROCEDURE — 25000242 PHARM REV CODE 250 ALT 637 W/ HCPCS: Mod: GY | Performed by: STUDENT IN AN ORGANIZED HEALTH CARE EDUCATION/TRAINING PROGRAM

## 2022-02-18 PROCEDURE — 25000242 PHARM REV CODE 250 ALT 637 W/ HCPCS: Performed by: EMERGENCY MEDICINE

## 2022-02-18 PROCEDURE — 94761 N-INVAS EAR/PLS OXIMETRY MLT: CPT

## 2022-02-18 PROCEDURE — 25000003 PHARM REV CODE 250: Performed by: STUDENT IN AN ORGANIZED HEALTH CARE EDUCATION/TRAINING PROGRAM

## 2022-02-18 PROCEDURE — 94640 AIRWAY INHALATION TREATMENT: CPT

## 2022-02-18 PROCEDURE — G0378 HOSPITAL OBSERVATION PER HR: HCPCS | Mod: CS

## 2022-02-18 PROCEDURE — 99900031 HC PATIENT EDUCATION (STAT)

## 2022-02-18 PROCEDURE — 96365 THER/PROPH/DIAG IV INF INIT: CPT

## 2022-02-18 PROCEDURE — 85025 COMPLETE CBC W/AUTO DIFF WBC: CPT | Performed by: STUDENT IN AN ORGANIZED HEALTH CARE EDUCATION/TRAINING PROGRAM

## 2022-02-18 PROCEDURE — 36415 COLL VENOUS BLD VENIPUNCTURE: CPT | Performed by: STUDENT IN AN ORGANIZED HEALTH CARE EDUCATION/TRAINING PROGRAM

## 2022-02-18 PROCEDURE — G0378 HOSPITAL OBSERVATION PER HR: HCPCS

## 2022-02-18 RX ORDER — ENOXAPARIN SODIUM 100 MG/ML
40 INJECTION SUBCUTANEOUS EVERY 24 HOURS
Status: DISCONTINUED | OUTPATIENT
Start: 2022-02-18 | End: 2022-02-18 | Stop reason: HOSPADM

## 2022-02-18 RX ORDER — FLUTICASONE FUROATE AND VILANTEROL 100; 25 UG/1; UG/1
1 POWDER RESPIRATORY (INHALATION) DAILY
Status: DISCONTINUED | OUTPATIENT
Start: 2022-02-18 | End: 2022-02-18 | Stop reason: HOSPADM

## 2022-02-18 RX ORDER — SODIUM CHLORIDE 0.9 % (FLUSH) 0.9 %
10 SYRINGE (ML) INJECTION
Status: DISCONTINUED | OUTPATIENT
Start: 2022-02-18 | End: 2022-02-18 | Stop reason: HOSPADM

## 2022-02-18 RX ORDER — PREDNISONE 20 MG/1
TABLET ORAL
Qty: 15 TABLET | Refills: 0 | Status: SHIPPED | OUTPATIENT
Start: 2022-02-18 | End: 2022-02-28

## 2022-02-18 RX ORDER — IPRATROPIUM BROMIDE AND ALBUTEROL SULFATE 2.5; .5 MG/3ML; MG/3ML
3 SOLUTION RESPIRATORY (INHALATION)
Status: DISCONTINUED | OUTPATIENT
Start: 2022-02-18 | End: 2022-02-18 | Stop reason: HOSPADM

## 2022-02-18 RX ORDER — BUDESONIDE 0.25 MG/2ML
0.25 INHALANT ORAL DAILY
Status: DISCONTINUED | OUTPATIENT
Start: 2022-02-18 | End: 2022-02-18 | Stop reason: HOSPADM

## 2022-02-18 RX ORDER — LISINOPRIL 5 MG/1
5 TABLET ORAL DAILY
Status: DISCONTINUED | OUTPATIENT
Start: 2022-02-18 | End: 2022-02-18 | Stop reason: HOSPADM

## 2022-02-18 RX ORDER — TALC
6 POWDER (GRAM) TOPICAL NIGHTLY PRN
Status: DISCONTINUED | OUTPATIENT
Start: 2022-02-18 | End: 2022-02-18 | Stop reason: HOSPADM

## 2022-02-18 RX ORDER — FUROSEMIDE 20 MG/1
20 TABLET ORAL 2 TIMES DAILY
Status: DISCONTINUED | OUTPATIENT
Start: 2022-02-18 | End: 2022-02-18 | Stop reason: HOSPADM

## 2022-02-18 RX ADMIN — FUROSEMIDE 20 MG: 20 TABLET ORAL at 09:02

## 2022-02-18 RX ADMIN — FLUTICASONE FUROATE AND VILANTEROL TRIFENATATE 1 PUFF: 100; 25 POWDER RESPIRATORY (INHALATION) at 10:02

## 2022-02-18 RX ADMIN — PREDNISONE 50 MG: 5 TABLET ORAL at 09:02

## 2022-02-18 RX ADMIN — LEVALBUTEROL HYDROCHLORIDE 1.25 MG: 1.25 SOLUTION, CONCENTRATE RESPIRATORY (INHALATION) at 12:02

## 2022-02-18 RX ADMIN — IPRATROPIUM BROMIDE AND ALBUTEROL SULFATE 3 ML: .5; 3 SOLUTION RESPIRATORY (INHALATION) at 08:02

## 2022-02-18 RX ADMIN — LEVOFLOXACIN 500 MG: 500 INJECTION, SOLUTION INTRAVENOUS at 12:02

## 2022-02-18 RX ADMIN — LISINOPRIL 5 MG: 5 TABLET ORAL at 09:02

## 2022-02-18 RX ADMIN — IPRATROPIUM BROMIDE AND ALBUTEROL SULFATE 3 ML: .5; 3 SOLUTION RESPIRATORY (INHALATION) at 02:02

## 2022-02-18 NOTE — ED NOTES
82 YOM BIB EMS c/o SOB worst with exertion that began this am. Pt stated PMH COPD no relief with inhaler. Wears 3 L nc continuous with no relief. Pt placed on 4 L nc O2 95%. Denies SOB on 4 L nc. Pt speaking in complete sentences. -CP. Bilateral lower extremity non pitting edema noted. Also c/o rectal pain secondary to hemorrhoids. Denies any other complaints.     Adult Physical Assessment  LOC: Patient is awake, alert, oriented and speaking appropriately at this time.  APPEARANCE: Patient resting comfortably and appears to be in no acute distress at this time. Patient is clean and well groomed, patient's clothing is properly fastened.  SKIN:The skin is warm and dry, color consistent with ethnicity, patient has normal skin turgor and moist mucus membranes, skin intact, no breakdown or brusing noted.  MUSCULOSKELETAL: Patient moving all extremities well, no obvious swelling or deformities noted.  RESPIRATORY: Airway is open and patent, respirations are spontaneous, patient has a normal effort and rate, no accessory muscle use noted.  CARDIAC: Patient has a normal rate and rhythm, capillary refill < 3 seconds in all extremities.  ABDOMEN: Soft and non tender to palpation, no abdominal distention noted.  NEUROLOGIC: Eyes open spontaneously, behavior appropriate to situation, follows commands, facial expression symmetrical, bilateral hand grasp equal and even, purposeful motor response noted, normal sensation in all extremities when touched with a finger.      VSS. ED workup in progress. Call light within pt reach. Safety measures in place: side rails up X2. Will continue to monitor.

## 2022-02-18 NOTE — SUBJECTIVE & OBJECTIVE
Past Medical History:   Diagnosis Date    Chronic back pain     COPD (chronic obstructive pulmonary disease)     Hypoxemia        Past Surgical History:   Procedure Laterality Date    bilateral cataracts      LUMBAR DISC SURGERY         Review of patient's allergies indicates:  No Known Allergies    No current facility-administered medications on file prior to encounter.     Current Outpatient Medications on File Prior to Encounter   Medication Sig    arformoteroL (BROVANA) 15 mcg/2 mL Nebu Take 15 mcg by nebulization 2 (two) times daily. Controller    budesonide (PULMICORT) 0.5 mg/2 mL nebulizer solution Take 0.5 mg by nebulization once daily. Controller    COMBIVENT RESPIMAT  mcg/actuation inhaler USE 1 INHALATION EVERY 6 HOURS AS NEEDED FOR WHEEZING (Patient taking differently: Inhale 1 puff into the lungs every 6 (six) hours as needed.)    fluticasone-umeclidin-vilanter (TRELEGY ELLIPTA) 100-62.5-25 mcg DsDv Inhale 1 puff into the lungs Daily.    furosemide (LASIX) 20 MG tablet Take 20 mg by mouth 2 (two) times daily.    lisinopriL (PRINIVIL,ZESTRIL) 5 MG tablet Take 5 mg by mouth once daily.    potassium chloride (MICRO-K) 10 MEQ CpSR Take 10 mEq by mouth once daily.    umeclidinium (INCRUSE ELLIPTA) 62.5 mcg/actuation inhalation capsule Inhale 62.5 mcg into the lungs once daily. Controller    albuterol (PROVENTIL) 2.5 mg /3 mL (0.083 %) nebulizer solution Take 2.5 mg by nebulization every 6 (six) hours as needed.    ipratropium-albuterol (COMBIVENT)  mcg/actuation inhaler Inhale 1 puff into the lungs every 6 (six) hours as needed for Wheezing. Rescue    [DISCONTINUED] arformoteroL (BROVANA) 15 mcg/2 mL Nebu 2 ml    [DISCONTINUED] fluocinolone (SYNALAR) 0.01 % external solution Apply behind ears bid prn for scaly itchy rash    [DISCONTINUED] fluticasone (FLONASE) 50 mcg/actuation nasal spray 1 spray by Each Nare route once daily.    [DISCONTINUED] furosemide (LASIX) 20 MG tablet 1  tablet    [DISCONTINUED] HYDROcodone-acetaminophen (NORCO) 7.5-325 mg per tablet TK 1 T PO Q 4 TO 6 H PRN    [DISCONTINUED] HYDROcodone-acetaminophen 7.5-300 mg Tab     [DISCONTINUED] ipratropium-albuterol (COMBIVENT)  mcg/actuation inhaler Inhale 1 puff into the lungs every 6 (six) hours as needed for Wheezing. Rescue    [DISCONTINUED] ketoconazole (NIZORAL) 2 % shampoo Use to wash scalp and ears qod  for scaly rash    [DISCONTINUED] lisinopriL 10 MG tablet      Family History     Problem Relation (Age of Onset)    Cancer Mother    Heart failure Father    Suicide Brother        Tobacco Use    Smoking status: Former Smoker     Packs/day: 1.00     Years: 40.00     Pack years: 40.00     Types: Cigarettes     Quit date:      Years since quittin.1    Smokeless tobacco: Never Used   Substance and Sexual Activity    Alcohol use: No    Drug use: No    Sexual activity: Never     Review of Systems   Constitutional: Negative for chills, fatigue and fever.   HENT: Negative for congestion.    Respiratory: Positive for cough, chest tightness, shortness of breath and wheezing.    Cardiovascular: Positive for leg swelling. Negative for chest pain and palpitations.   Gastrointestinal: Negative for abdominal pain, constipation, diarrhea and nausea.   Genitourinary: Negative for dysuria.   Skin: Negative for rash and wound.   Neurological: Negative for dizziness and headaches.     Objective:     Vital Signs (Most Recent):  Temp: 98.4 °F (36.9 °C) (22)  Pulse: 96 (22)  Resp: 20 (22)  BP: (!) 148/76 (22)  SpO2: 95 % (22) Vital Signs (24h Range):  Temp:  [98.4 °F (36.9 °C)] 98.4 °F (36.9 °C)  Pulse:  [87-96] 96  Resp:  [20-24] 20  SpO2:  [91 %-96 %] 95 %  BP: (148-189)/(64-92) 148/76     Weight: 87.1 kg (192 lb)  Body mass index is 28.35 kg/m².    Physical Exam  Vitals reviewed.   Constitutional:       General: He is not in acute distress.     Appearance: He  is not ill-appearing.   HENT:      Head: Normocephalic.   Eyes:      Pupils: Pupils are equal, round, and reactive to light.   Cardiovascular:      Rate and Rhythm: Normal rate and regular rhythm.   Pulmonary:      Breath sounds: Wheezing present.      Comments: Diffuse end expiratory wheeze  Abdominal:      Tenderness: There is no abdominal tenderness. There is no guarding.   Skin:     General: Skin is warm.      Capillary Refill: Capillary refill takes less than 2 seconds.   Neurological:      General: No focal deficit present.      Mental Status: He is alert.      Cranial Nerves: No cranial nerve deficit.   Psychiatric:         Mood and Affect: Mood normal.           CRANIAL NERVES     CN III, IV, VI   Pupils are equal, round, and reactive to light.       Significant Labs:   CBC:   Recent Labs   Lab 02/17/22 1926   WBC 12.17   HGB 9.7*   HCT 32.6*        CMP:   Recent Labs   Lab 02/17/22 1926      K 4.2      CO2 34*   *   BUN 13   CREATININE 0.6   CALCIUM 8.4*   PROT 6.7   ALBUMIN 3.8   BILITOT 0.6   ALKPHOS 39*   AST 30   ALT 13   ANIONGAP 11   EGFRNONAA >60.0     Cardiac Markers:   Recent Labs   Lab 02/17/22 1926   BNP 24       Significant Imaging: CXR: I have reviewed all pertinent results/findings within the past 24 hours and my personal findings are:  Right lower linear opacity

## 2022-02-18 NOTE — ASSESSMENT & PLAN NOTE
Patient with dyspnea, chest tightness, wheeze, and cough with increase in sputum production. On 4L via NC in ED, on 3L at home.  S/p solumedrol 125mg in the ED  start prednisone 50mg daily x5   Start levofloxacin x5 days  Start duonebs q6 while awake

## 2022-02-18 NOTE — ED PROVIDER NOTES
Encounter Date: 2022       History     Chief Complaint   Patient presents with    Shortness of Breath     Pt here from home via ems with c/o sob that began this morning, pt reports hx of COPD, wears 3 L oxygen via NC at all time. Pt states he used his rescue inhaler without relief. Ems administered a Duo Neb and 125 mg of solumedrol iv in route. Pt denies chest pain, c/o pain to rectum r/t hemorrhoids.      82-year-old male with a history of COPD presents complaining of progressively worsening coughing wheezing and shortness of breath over the past 2-3 days.  He is on home oxygen however he has been short of breath despite this.  Shortness of breath is worse with exertion worse with lying flat and better with sitting up.  He has not been taking his nebulizer treatments on a regular basis.  He reports intermittent tightness in the chest but denies heaviness pressure or any pain radiating into his neck jaws back or arms.  He report reports progressively worsening lower extremity edema over the past week as well.  He reports COVID infection within the past month.  He has had generalized weakness fatigue and malaise.  He denies any other problems or complaints.        Review of patient's allergies indicates:  No Known Allergies  Past Medical History:   Diagnosis Date    Chronic back pain     COPD (chronic obstructive pulmonary disease)     Hypoxemia      Past Surgical History:   Procedure Laterality Date    bilateral cataracts      LUMBAR DISC SURGERY       Family History   Problem Relation Age of Onset    Cancer Mother     Heart failure Father     Suicide Brother      Social History     Tobacco Use    Smoking status: Former Smoker     Packs/day: 1.00     Years: 40.00     Pack years: 40.00     Types: Cigarettes     Quit date:      Years since quittin.1    Smokeless tobacco: Never Used   Substance Use Topics    Alcohol use: No    Drug use: No     Review of Systems   Constitutional: Positive  for fatigue. Negative for activity change, appetite change, chills, diaphoresis, fever and unexpected weight change.   HENT: Negative.  Negative for congestion, dental problem, ear pain, nosebleeds, postnasal drip, rhinorrhea, sinus pressure, sinus pain, sore throat, tinnitus, trouble swallowing and voice change.    Eyes: Negative.  Negative for pain and visual disturbance.   Respiratory: Positive for cough, chest tightness, shortness of breath and wheezing.    Cardiovascular: Positive for leg swelling. Negative for chest pain and palpitations.   Gastrointestinal: Negative.  Negative for abdominal distention, abdominal pain, anal bleeding, blood in stool, constipation, diarrhea, nausea, rectal pain and vomiting.   Endocrine: Negative.    Genitourinary: Negative.  Negative for difficulty urinating, dysuria, flank pain, frequency, penile pain, scrotal swelling, testicular pain and urgency.   Musculoskeletal: Negative.  Negative for arthralgias, back pain, gait problem, joint swelling, myalgias, neck pain and neck stiffness.   Skin: Negative.  Negative for color change, pallor and rash.   Neurological: Negative.  Negative for dizziness, seizures, syncope, facial asymmetry, speech difficulty, weakness, light-headedness, numbness and headaches.   Hematological: Negative.  Does not bruise/bleed easily.   Psychiatric/Behavioral: Negative.  Negative for confusion.   All other systems reviewed and are negative.      Physical Exam     Initial Vitals [02/17/22 1912]   BP Pulse Resp Temp SpO2   (!) 154/64 89 (!) 24 98.4 °F (36.9 °C) (!) 94 %      MAP       --         Physical Exam    Nursing note and vitals reviewed.  Constitutional: He appears well-developed and well-nourished. He is diaphoretic. He is active.  Non-toxic appearance. He does not have a sickly appearance. He does not appear ill. No distress.   HENT:   Head: Normocephalic and atraumatic.   Nose: Nose normal.   Mouth/Throat: Oropharynx is clear and moist.   Eyes:  Conjunctivae, EOM and lids are normal. Pupils are equal, round, and reactive to light. Right eye exhibits no discharge. Left eye exhibits no discharge. No scleral icterus.   Neck: Neck supple. No thyromegaly present. No tracheal deviation present. No JVD present.   Normal range of motion.   Full passive range of motion without pain.     Cardiovascular: Normal rate, regular rhythm, normal heart sounds, intact distal pulses and normal pulses. Exam reveals no gallop and no friction rub.    No murmur heard.  Pulmonary/Chest: Accessory muscle usage present. No stridor. Tachypnea noted. He is in respiratory distress. He has decreased breath sounds. He has wheezes. He has rhonchi. He has no rales.   Abdominal: Abdomen is soft. Normal appearance and bowel sounds are normal. He exhibits no distension and no mass. There is no abdominal tenderness. No hernia. There is no rebound and no guarding.   Musculoskeletal:         General: Edema present. No tenderness. Normal range of motion.      Cervical back: Full passive range of motion without pain, normal range of motion and neck supple. No swelling, edema, erythema, rigidity, tenderness or bony tenderness. No spinous process tenderness or muscular tenderness. Normal range of motion and normal range of motion. Normal.      Thoracic back: No swelling, tenderness or bony tenderness. Normal range of motion.      Lumbar back: Normal. No swelling, edema, tenderness or bony tenderness. Normal range of motion.      Right lower leg: Swelling present. Edema present.      Left lower leg: Swelling present. Edema present.      Right foot: Normal range of motion. No tenderness or bony tenderness. Normal pulse.      Left foot: Normal range of motion and normal capillary refill. No tenderness or bony tenderness. Normal pulse.      Comments: Pulses are 2+ throughout, cap refill is less than 2 sec throughout, no edema noted, extremities are nontender throughout with full range of motion      Lymphadenopathy:     He has no cervical adenopathy.   Neurological: He is alert and oriented to person, place, and time. He has normal strength. No cranial nerve deficit or sensory deficit. Coordination normal. GCS score is 15. GCS eye subscore is 4. GCS verbal subscore is 5. GCS motor subscore is 6.   Skin: Skin is warm. Capillary refill takes less than 2 seconds. No ecchymosis, no petechiae and no rash noted. No cyanosis or erythema. No pallor.   Psychiatric: He has a normal mood and affect. His speech is normal and behavior is normal. Judgment and thought content normal. Cognition and memory are normal.         ED Course   Procedures  Labs Reviewed   CBC W/ AUTO DIFFERENTIAL - Abnormal; Notable for the following components:       Result Value    RBC 3.39 (*)     Hemoglobin 9.7 (*)     Hematocrit 32.6 (*)     MCHC 29.8 (*)     RDW 16.9 (*)     Immature Granulocytes 0.7 (*)     Gran # (ANC) 8.6 (*)     Immature Grans (Abs) 0.09 (*)     All other components within normal limits   COMPREHENSIVE METABOLIC PANEL - Abnormal; Notable for the following components:    CO2 34 (*)     Glucose 137 (*)     Calcium 8.4 (*)     Alkaline Phosphatase 39 (*)     All other components within normal limits   URINALYSIS - Abnormal; Notable for the following components:    Protein, UA Trace (*)     Occult Blood UA 1+ (*)     Urobilinogen, UA 2.0-3.0 (*)     All other components within normal limits    Narrative:     Specimen Source->Urine   URINALYSIS MICROSCOPIC - Abnormal; Notable for the following components:    RBC, UA 10 (*)     WBC, UA 7 (*)     Hyaline Casts, UA 5 (*)     All other components within normal limits    Narrative:     Specimen Source->Urine   CULTURE, BLOOD   CULTURE, BLOOD   SARS-COV-2 RNA AMPLIFICATION, QUAL   DRUG SCREEN PANEL, URINE EMERGENCY    Narrative:     Specimen Source->Urine   CK   INFLUENZA A AND B ANTIGEN    Narrative:     Specimen Source->Nasopharyngeal Swab   LACTIC ACID, PLASMA   PROCALCITONIN    MAGNESIUM   B-TYPE NATRIURETIC PEPTIDE   MAGNESIUM   TROPONIN I   B-TYPE NATRIURETIC PEPTIDE   CK   MAGNESIUM   B-TYPE NATRIURETIC PEPTIDE   TROPONIN I   APTT   PROTIME-INR   APTT   B-TYPE NATRIURETIC PEPTIDE   LIPASE   MAGNESIUM   PROTIME-INR   TROPONIN I   TSH   APTT   PROTIME-INR          Imaging Results          X-Ray Chest AP Portable (Final result)  Result time 02/17/22 19:48:32   Procedure changed from X-Ray Chest 1 View     Final result by Kuldip Calhoun MD (02/17/22 19:48:32)                 Narrative:    Reason: shortness of breath    FINDINGS:    PA and lateral chest with comparison chest x-ray August 6, 2019 show normal cardiomediastinal silhouette.  There are linear densities of the right lung base. Left lung is clear. Pulmonary vasculature is normal. No acute osseous abnormality.    IMPRESSION:    Linear densities of the right lung base could reflect atelectasis or infiltrate.    Electronically signed by:  Kuldip Calhoun DO  2/17/2022 7:48 PM CST Workstation: [id number]                               Medications   levalbuterol nebulizer solution 1.25 mg (has no administration in time range)   levoFLOXacin 500 mg/100 mL IVPB 500 mg (has no administration in time range)   budesonide nebulizer solution 0.5 mg (0.5 mg Nebulization Given 2/17/22 2024)   levalbuterol nebulizer solution 1.25 mg (1.25 mg Nebulization Given 2/17/22 2022)   ipratropium 0.02 % nebulizer solution 0.5 mg (0.5 mg Nebulization Given 2/17/22 2013)   methylPREDNISolone sodium succinate injection 125 mg (125 mg Intravenous Given 2/17/22 2036)     Medical Decision Making:   Clinical Tests:   Lab Tests: Reviewed  Radiological Study: Reviewed  Medical Tests: Reviewed  ED Management:  Patient presents with shortness of breath wheezing history of COPD, on 3 L of oxygen on a regular basis however is currently demonstrating evidence of a COPD exacerbation.  Lower extremity edema noted as well.  BNP pending.  Due to lab error still awaiting  BNP.  Respiratory treatments have been initiated.  IV steroids given.  EKG is negative for evidence of acute ischemia.  Troponin is normal.  Patient still with diffuse wheezing and airway restriction, although he reports some improvement.  Will discuss with hospitalist for admission.            Attending Attestation:         Attending Critical Care:   Critical Care Times:   Direct Patient Care (initial evaluation, reassessments, and time considering the case)................................................................15 minutes.   Ordering, Reviewing, and Interpreting Diagnostic Studies...............................................................................................................5 minutes.   Documentation..................................................................................................................................................................................4 minutes.   Consultation with other Physicians. .................................................................................................................................................4 minutes.   Consultation with the patient's family directly relating to the patient's condition, care, and DNR status (when patient unable)......3 minutes.   ==============================================================  · Total Critical Care Time - exclusive of procedural time: 31 minutes.  ==============================================================                   Clinical Impression:   Final diagnoses:  [R06.02] Shortness of breath  [J44.1] COPD exacerbation (Primary)          ED Disposition Condition    Admit               Mere Mcgill MD  02/17/22 [id number]       Mere Mcgill MD  02/17/22 [id number]

## 2022-02-18 NOTE — H&P
[facility] Emergency Dept  Hospital Medicine  History & Physical    Patient Name: Sridhar Florentino  MRN: [id number]  Patient Class: OP- Observation  Admission Date: 2/17/2022  Attending Physician: Mere Mcgill MD   Primary Care Provider: JASWANT Allen         Patient information was obtained from patient and ER records.     Subjective:     Principal Problem:COPD exacerbation    Chief Complaint:   Chief Complaint   Patient presents with    Shortness of Breath     Pt here from home via ems with c/o sob that began this morning, pt reports hx of COPD, wears 3 L oxygen via NC at all time. Pt states he used his rescue inhaler without relief. Ems administered a Duo Neb and 125 mg of solumedrol iv in route. Pt denies chest pain, c/o pain to rectum r/t hemorrhoids.         HPI: 82 year old man PMHx COPD on 3L home oxygen presents with 2 days worsening dyspnea, wheeze, and cough productive of copious pearly sputum. He normally sees . The patient is a poor historian and it is unclear if he is compliant with his lasix, inhalers, or nebulized medications. He endorses leg swelling, but this is chronic. He denies chest pain, pre-syncope, syncope, nausea, vomiting, dysuria.    He received IV steroids and levofloxacin in the ED. Lower extremity US was negative for DVT.      Past Medical History:   Diagnosis Date    Chronic back pain     COPD (chronic obstructive pulmonary disease)     Hypoxemia        Past Surgical History:   Procedure Laterality Date    bilateral cataracts      LUMBAR DISC SURGERY         Review of patient's allergies indicates:  No Known Allergies    No current facility-administered medications on file prior to encounter.     Current Outpatient Medications on File Prior to Encounter   Medication Sig    arformoteroL (BROVANA) 15 mcg/2 mL Nebu Take 15 mcg by nebulization 2 (two) times daily. Controller    budesonide (PULMICORT) 0.5 mg/2 mL nebulizer solution Take 0.5 mg by  nebulization once daily. Controller    COMBIVENT RESPIMAT  mcg/actuation inhaler USE 1 INHALATION EVERY 6 HOURS AS NEEDED FOR WHEEZING (Patient taking differently: Inhale 1 puff into the lungs every 6 (six) hours as needed.)    fluticasone-umeclidin-vilanter (TRELEGY ELLIPTA) 100-62.5-25 mcg DsDv Inhale 1 puff into the lungs Daily.    furosemide (LASIX) 20 MG tablet Take 20 mg by mouth 2 (two) times daily.    lisinopriL (PRINIVIL,ZESTRIL) 5 MG tablet Take 5 mg by mouth once daily.    potassium chloride (MICRO-K) 10 MEQ CpSR Take 10 mEq by mouth once daily.    umeclidinium (INCRUSE ELLIPTA) 62.5 mcg/actuation inhalation capsule Inhale 62.5 mcg into the lungs once daily. Controller    albuterol (PROVENTIL) 2.5 mg /3 mL (0.083 %) nebulizer solution Take 2.5 mg by nebulization every 6 (six) hours as needed.    ipratropium-albuterol (COMBIVENT)  mcg/actuation inhaler Inhale 1 puff into the lungs every 6 (six) hours as needed for Wheezing. Rescue    [DISCONTINUED] arformoteroL (BROVANA) 15 mcg/2 mL Nebu 2 ml    [DISCONTINUED] fluocinolone (SYNALAR) 0.01 % external solution Apply behind ears bid prn for scaly itchy rash    [DISCONTINUED] fluticasone (FLONASE) 50 mcg/actuation nasal spray 1 spray by Each Nare route once daily.    [DISCONTINUED] furosemide (LASIX) 20 MG tablet 1 tablet    [DISCONTINUED] HYDROcodone-acetaminophen (NORCO) 7.5-325 mg per tablet TK 1 T PO Q 4 TO 6 H PRN    [DISCONTINUED] HYDROcodone-acetaminophen 7.5-300 mg Tab     [DISCONTINUED] ipratropium-albuterol (COMBIVENT)  mcg/actuation inhaler Inhale 1 puff into the lungs every 6 (six) hours as needed for Wheezing. Rescue    [DISCONTINUED] ketoconazole (NIZORAL) 2 % shampoo Use to wash scalp and ears qod  for scaly rash    [DISCONTINUED] lisinopriL 10 MG tablet      Family History     Problem Relation (Age of Onset)    Cancer Mother    Heart failure Father    Suicide Brother        Tobacco Use    Smoking status:  Former Smoker     Packs/day: 1.00     Years: 40.00     Pack years: 40.00     Types: Cigarettes     Quit date:      Years since quittin.1    Smokeless tobacco: Never Used   Substance and Sexual Activity    Alcohol use: No    Drug use: No    Sexual activity: Never     Review of Systems   Constitutional: Negative for chills, fatigue and fever.   HENT: Negative for congestion.    Respiratory: Positive for cough, chest tightness, shortness of breath and wheezing.    Cardiovascular: Positive for leg swelling. Negative for chest pain and palpitations.   Gastrointestinal: Negative for abdominal pain, constipation, diarrhea and nausea.   Genitourinary: Negative for dysuria.   Skin: Negative for rash and wound.   Neurological: Negative for dizziness and headaches.     Objective:     Vital Signs (Most Recent):  Temp: 98.4 °F (36.9 °C) (22)  Pulse: 96 (22)  Resp: 20 (22)  BP: (!) 148/76 (22)  SpO2: 95 % (22) Vital Signs (24h Range):  Temp:  [98.4 °F (36.9 °C)] 98.4 °F (36.9 °C)  Pulse:  [87-96] 96  Resp:  [20-24] 20  SpO2:  [91 %-96 %] 95 %  BP: (148-189)/(64-92) 148/76     Weight: 87.1 kg (192 lb)  Body mass index is 28.35 kg/m².    Physical Exam  Vitals reviewed.   Constitutional:       General: He is not in acute distress.     Appearance: He is not ill-appearing.   HENT:      Head: Normocephalic.   Eyes:      Pupils: Pupils are equal, round, and reactive to light.   Cardiovascular:      Rate and Rhythm: Normal rate and regular rhythm.   Pulmonary:      Breath sounds: Wheezing present.      Comments: Diffuse end expiratory wheeze  Abdominal:      Tenderness: There is no abdominal tenderness. There is no guarding.   Skin:     General: Skin is warm.      Capillary Refill: Capillary refill takes less than 2 seconds.   Neurological:      General: No focal deficit present.      Mental Status: He is alert.      Cranial Nerves: No cranial nerve deficit.    Psychiatric:         Mood and Affect: Mood normal.           CRANIAL NERVES     CN III, IV, VI   Pupils are equal, round, and reactive to light.       Significant Labs:   CBC:   Recent Labs   Lab 02/17/22 1926   WBC 12.17   HGB 9.7*   HCT 32.6*        CMP:   Recent Labs   Lab 02/17/22 1926      K 4.2      CO2 34*   *   BUN 13   CREATININE 0.6   CALCIUM 8.4*   PROT 6.7   ALBUMIN 3.8   BILITOT 0.6   ALKPHOS 39*   AST 30   ALT 13   ANIONGAP 11   EGFRNONAA >60.0     Cardiac Markers:   Recent Labs   Lab 02/17/22 1926   BNP 24       Significant Imaging: CXR: I have reviewed all pertinent results/findings within the past 24 hours and my personal findings are:  Right lower linear opacity    Assessment/Plan:     * COPD exacerbation  Patient with dyspnea, chest tightness, wheeze, and cough with increase in sputum production. On 4L via NC in ED, on 3L at home.  S/p solumedrol 125mg in the ED.    Continuous pulse oximetry  start prednisone 50mg daily x5   Start levofloxacin x5 days  Start duonebs q6 while awake          Granulomatous lung disease        Oxygen dependent  On 3L home oxygen      Venous insufficiency of both lower extremities  History of lower extremity swelling.  US lower extremities negative for DVT      VTE Risk Mitigation (From admission, onward)    None             Supa Lu MD  Department of Hospital Medicine   [facility] - Emergency Dept

## 2022-02-18 NOTE — CARE UPDATE
02/18/22 0801   Patient Assessment/Suction   Level of Consciousness (AVPU) alert   Respiratory Effort Mild;Labored   Expansion/Accessory Muscles/Retractions retractions minimal   All Lung Fields Breath Sounds wheezes, expiratory   Cough Frequency infrequent   Cough Type dry;good   PRE-TX-O2   O2 Device (Oxygen Therapy) nasal cannula   $ Is the patient on Low Flow Oxygen? Yes   Flow (L/min) 3   SpO2 96 %   Pulse Oximetry Type Continuous   $ Pulse Oximetry - Multiple Charge Pulse Oximetry - Multiple   Pulse 95   Resp 20   Aerosol Therapy   $ Aerosol Therapy Charges Aerosol Treatment   Daily Review of Necessity (SVN) completed   Respiratory Treatment Status (SVN) given   Treatment Route (SVN) mask   Patient Position (SVN) HOB elevated   Post Treatment Assessment (SVN) breath sounds unchanged   Signs of Intolerance (SVN) none   Breath Sounds Post-Respiratory Treatment   Throughout All Fields Post-Treatment All Fields   Post-treatment Heart Rate (beats/min) 99   Post-treatment Resp Rate (breaths/min) 20   Education   $ Education Bronchodilator;15 min

## 2022-02-18 NOTE — PLAN OF CARE
Medicare Outpatient Observation Notice was signed, explained and given to patient/caregiver on 02/18/2022 at 8:30am     addressed any questions or concerns.    Medicare Outpatient Observation Notice will be scanned into patient's medical record

## 2022-02-18 NOTE — ASSESSMENT & PLAN NOTE
Patient with dyspnea, chest tightness, wheeze, and cough with increase in sputum production. On 4L via NC in ED, on 3L at home.  S/p solumedrol 125mg in the ED.    Continuous pulse oximetry  start prednisone 50mg daily x5   Start levofloxacin x5 days  Start duonebs q6 while awake

## 2022-02-18 NOTE — HPI
82 year old man PMHx COPD on 3L home oxygen presents with 2 days worsening dyspnea, wheeze, and cough productive of copious pearly sputum. He normally sees . The patient is a poor historian and it is unclear if he is compliant with his lasix, inhalers, or nebulized medications. He endorses leg swelling, but this is chronic. He denies chest pain, pre-syncope, syncope, nausea, vomiting, dysuria.    He received IV steroids and levofloxacin in the ED. Lower extremity US was negative for DVT.

## 2022-02-19 NOTE — DISCHARGE SUMMARY
[facility] Medicine  Discharge Summary      Patient Name: Sridhar Florentino  MRN: [id number]  Patient Class: OP- Observation  Admission Date: 2/17/2022  Hospital Length of Stay: 0 days  Discharge Date and Time:  02/18/2022 6:41 PM  Attending Physician: No att. providers found   Discharging Provider: Olman Macedo MD  Primary Care Provider: JASWANT Allen      HPI:   82 year old man PMHx COPD on 3L home oxygen presents with 2 days worsening dyspnea, wheeze, and cough productive of copious pearly sputum. He normally sees . The patient is a poor historian and it is unclear if he is compliant with his lasix, inhalers, or nebulized medications. He endorses leg swelling, but this is chronic. He denies chest pain, pre-syncope, syncope, nausea, vomiting, dysuria.    He received IV steroids and levofloxacin in the ED. Lower extremity US was negative for DVT.      * No surgery found *      Hospital Course:   Patient with COPD on Home oxygen got admitted with acute COPD exacerbation  Pt was treated with nebs/steroids and his condition got better  Later pt was discharged to home        Goals of Care Treatment Preferences:  Code Status: Full Code      Consults:     No new Assessment & Plan notes have been filed under this hospital service since the last note was generated.  Service: Hospital Medicine    Final Active Diagnoses:    Diagnosis Date Noted POA    Granulomatous lung disease [J84.10] 09/03/2019 Yes    Chronic hypoxemic respiratory failure [J96.11] 01/14/2019 Yes    Venous insufficiency of both lower extremities [I87.2] 02/05/2018 Yes    Oxygen dependent [Z99.81] 02/05/2018 Not Applicable      Problems Resolved During this Admission:       Discharged Condition: good    Disposition: Home or Self Care    Follow Up:   Follow-up Information     JASWANT Allen In 1 week.    Specialty: Pulmonary Disease  Contact information:  [address] [facility]  [address]  [place]  [address]  [phone]                       Patient Instructions:   No discharge procedures on file.    Significant Diagnostic Studies: Labs:   CMP   Recent Labs   Lab 02/17/22 1926 02/18/22  0510    141   K 4.2 4.0    96   CO2 34* 38*   * 160*   BUN 13 17   CREATININE 0.6 0.8   CALCIUM 8.4* 8.8   PROT 6.7 6.9   ALBUMIN 3.8 3.7   BILITOT 0.6 0.8   ALKPHOS 39* 39*   AST 30 21   ALT 13 15   ANIONGAP 11 7*   ESTGFRAFRICA >60.0 >60.0   EGFRNONAA >60.0 >60.0    and CBC   Recent Labs   Lab 02/17/22 1926 02/17/22 1926 02/18/22  0510   WBC 12.17  --  10.77   HGB 9.7*  --  9.3*   HCT 32.6*   < > 30.9*     --  240    < > = values in this interval not displayed.       Pending Diagnostic Studies:     Procedure Component Value Units Date/Time    APTT [[id number]] Collected: 02/17/22 1950    Order Status: Sent Lab Status: In process Updated: 02/17/22 1951    Specimen: Blood     BNP [[id number]] Collected: 02/17/22 1950    Order Status: Sent Lab Status: In process Updated: 02/17/22 1951    Specimen: Blood     Lipase [[id number]] Collected: 02/17/22 1950    Order Status: Sent Lab Status: In process Updated: 02/17/22 1951    Specimen: Blood     Magnesium [[id number]] Collected: 02/17/22 1950    Order Status: Sent Lab Status: In process Updated: 02/17/22 1951    Specimen: Blood     Protime-INR [[id number]] Collected: 02/17/22 1950    Order Status: Sent Lab Status: In process Updated: 02/17/22 1951    Specimen: Blood     TSH [[id number]] Collected: 02/17/22 1950    Order Status: Sent Lab Status: In process Updated: 02/17/22 1951    Specimen: Blood     Troponin I [[id number]] Collected: 02/17/22 1950    Order Status: Sent Lab Status: In process Updated: 02/17/22 1951    Specimen: Blood          Medications:  Reconciled Home Medications:      Medication List      START taking these medications    predniSONE 20 MG tablet  Commonly known as: DELTASONE  Take 2 tablets (40 mg total) by mouth once daily for 5 days, THEN 1  tablet (20 mg total) once daily for 5 days.  Start taking on: February 18, 2022        CHANGE how you take these medications    arformoteroL 15 mcg/2 mL Nebu  Commonly known as: BROVANA  Take 15 mcg by nebulization 2 (two) times daily. Controller  What changed: Another medication with the same name was removed. Continue taking this medication, and follow the directions you see here.     furosemide 20 MG tablet  Commonly known as: LASIX  Take 20 mg by mouth 2 (two) times daily.  What changed: Another medication with the same name was removed. Continue taking this medication, and follow the directions you see here.     * ipratropium-albuteroL  mcg/actuation inhaler  Commonly known as: CombiVENT  Inhale 1 puff into the lungs every 6 (six) hours as needed for Wheezing. Rescue  What changed:   · Another medication with the same name was changed. Make sure you understand how and when to take each.  · Another medication with the same name was removed. Continue taking this medication, and follow the directions you see here.     * CombiVENT RESPIMAT  mcg/actuation inhaler  Generic drug: ipratropium-albuteroL  USE 1 INHALATION EVERY 6 HOURS AS NEEDED FOR WHEEZING  What changed:   · See the new instructions.  · Another medication with the same name was removed. Continue taking this medication, and follow the directions you see here.     lisinopriL 5 MG tablet  Commonly known as: PRINIVIL,ZESTRIL  Take 5 mg by mouth once daily.  What changed: Another medication with the same name was removed. Continue taking this medication, and follow the directions you see here.         * This list has 2 medication(s) that are the same as other medications prescribed for you. Read the directions carefully, and ask your doctor or other care provider to review them with you.            CONTINUE taking these medications    albuterol 2.5 mg /3 mL (0.083 %) nebulizer solution  Commonly known as: PROVENTIL  Take 2.5 mg by nebulization  every 6 (six) hours as needed.     budesonide 0.5 mg/2 mL nebulizer solution  Commonly known as: PULMICORT  Take 0.5 mg by nebulization once daily. Controller     fluticasone-umeclidin-vilanter 100-62.5-25 mcg Dsdv  Commonly known as: TRELEGY ELLIPTA  Inhale 1 puff into the lungs Daily.     potassium chloride 10 MEQ Cpsr  Commonly known as: MICRO-K  Take 10 mEq by mouth once daily.     umeclidinium 62.5 mcg/actuation inhalation capsule  Commonly known as: INCRUSE ELLIPTA  Inhale 62.5 mcg into the lungs once daily. Controller        STOP taking these medications    fluocinolone 0.01 % external solution  Commonly known as: SYNALAR     fluticasone propionate 50 mcg/actuation nasal spray  Commonly known as: FLONASE     HYDROcodone-acetaminophen 7.5-300 mg Tab     HYDROcodone-acetaminophen 7.5-325 mg per tablet  Commonly known as: NORCO     ketoconazole 2 % shampoo  Commonly known as: NIZORAL            Indwelling Lines/Drains at time of discharge:   Lines/Drains/Airways     None               Physical Exam   Constitutional: He is oriented to person, place, and time.   Cardiovascular: Normal rate.   Neurological: He is alert and oriented to person, place, and time.     Time spent on the discharge of patient: 24  minutes         Olman Macedo MD  Department of Hospital Medicine  [facility]

## 2022-02-19 NOTE — HOSPITAL COURSE
Patient with COPD on Home oxygen got admitted with acute COPD exacerbation  Pt was treated with nebs/steroids and his condition got better  Later pt was discharged to home

## 2022-02-21 NOTE — PLAN OF CARE
Chart and discharge orders reviewed.  Patient discharged home with no further case management needs         02/21/22 165   Final Note   Assessment Type Final Discharge Note   Anticipated Discharge Disposition Home

## 2022-02-22 LAB
BACTERIA BLD CULT: NORMAL
BACTERIA BLD CULT: NORMAL

## 2022-03-29 ENCOUNTER — OFFICE VISIT (OUTPATIENT)
Dept: PULMONOLOGY | Facility: CLINIC | Age: 82
End: 2022-03-29
Payer: MEDICARE

## 2022-03-29 VITALS
BODY MASS INDEX: 27.4 KG/M2 | SYSTOLIC BLOOD PRESSURE: 170 MMHG | WEIGHT: 185 LBS | HEART RATE: 100 BPM | HEIGHT: 69 IN | OXYGEN SATURATION: 94 % | DIASTOLIC BLOOD PRESSURE: 80 MMHG

## 2022-03-29 DIAGNOSIS — J44.9 CHRONIC OBSTRUCTIVE PULMONARY DISEASE, UNSPECIFIED COPD TYPE: Primary | ICD-10-CM

## 2022-03-29 DIAGNOSIS — J96.11 CHRONIC HYPOXEMIC RESPIRATORY FAILURE: ICD-10-CM

## 2022-03-29 PROCEDURE — 99214 OFFICE O/P EST MOD 30 MIN: CPT | Mod: S$GLB,,, | Performed by: NURSE PRACTITIONER

## 2022-03-29 PROCEDURE — 99214 PR OFFICE/OUTPT VISIT, EST, LEVL IV, 30-39 MIN: ICD-10-PCS | Mod: S$GLB,,, | Performed by: NURSE PRACTITIONER

## 2022-03-29 RX ORDER — KETOCONAZOLE 20 MG/ML
SHAMPOO, SUSPENSION TOPICAL
COMMUNITY
Start: 2021-09-27

## 2022-03-29 RX ORDER — REVEFENACIN 175 UG/3ML
175 SOLUTION RESPIRATORY (INHALATION) DAILY
Qty: 30 EACH | Refills: 6 | Status: SHIPPED | OUTPATIENT
Start: 2022-03-29

## 2022-03-29 RX ORDER — PREDNISONE 2.5 MG/1
TABLET ORAL
COMMUNITY
Start: 2022-03-15

## 2022-03-29 RX ORDER — LISINOPRIL 10 MG/1
TABLET ORAL
COMMUNITY
Start: 2021-09-16

## 2022-03-29 RX ORDER — FLUOCINOLONE ACETONIDE 0.1 MG/ML
SOLUTION TOPICAL
COMMUNITY
Start: 2021-09-27

## 2022-03-29 NOTE — PATIENT INSTRUCTIONS
Add Yupelri daily   Continue the brovana and Budesonide twice a day, rinse after you use it  Oxygen all the time

## 2022-03-29 NOTE — PROGRESS NOTES
SUBJECTIVE:    Patient ID: Sridhar Florentino is a 82 y.o. male.    Chief Complaint: COPD    HPI   Patient here today feeling alright. He is using Brovana and budesonide twice a day as well as albuterol. He is wearing his oxygen all of the time. He is short of breath with any sort of exertion and is suffering from chronic back pain.  He has not been in office since 2020  Past Medical History:   Diagnosis Date    Chronic back pain     COPD (chronic obstructive pulmonary disease)     Hypoxemia      Past Surgical History:   Procedure Laterality Date    bilateral cataracts      LUMBAR DISC SURGERY       Family History   Problem Relation Age of Onset    Cancer Mother     Heart failure Father     Suicide Brother         Social History:   Marital Status:   Occupation: Data Unavailable  Alcohol History:  reports no history of alcohol use.  Tobacco History:  reports that he quit smoking about 13 years ago. His smoking use included cigarettes. He has a 40.00 pack-year smoking history. He has never used smokeless tobacco.  Drug History:  reports no history of drug use.    Review of patient's allergies indicates:  No Known Allergies    Current Outpatient Medications   Medication Sig Dispense Refill    albuterol (PROVENTIL) 2.5 mg /3 mL (0.083 %) nebulizer solution Take 2.5 mg by nebulization every 6 (six) hours as needed.      budesonide (PULMICORT) 0.5 mg/2 mL nebulizer solution Take 0.5 mg by nebulization once daily. Controller      COMBIVENT RESPIMAT  mcg/actuation inhaler USE 1 INHALATION EVERY 6 HOURS AS NEEDED FOR WHEEZING (Patient taking differently: Inhale 1 puff into the lungs every 6 (six) hours as needed.) 12 g 3    fluocinolone (SYNALAR) 0.01 % external solution       furosemide (LASIX) 20 MG tablet Take 20 mg by mouth 2 (two) times daily.      ipratropium-albuterol (COMBIVENT)  mcg/actuation inhaler Inhale 1 puff into the lungs every 6 (six) hours as needed for Wheezing. Rescue 3  "Package 3    ketoconazole (NIZORAL) 2 % shampoo       lisinopriL (PRINIVIL,ZESTRIL) 5 MG tablet Take 5 mg by mouth once daily.      lisinopriL 10 MG tablet       potassium chloride (MICRO-K) 10 MEQ CpSR Take 10 mEq by mouth once daily.      predniSONE (DELTASONE) 2.5 MG tablet       arformoteroL (BROVANA) 15 mcg/2 mL Nebu Take 15 mcg by nebulization 2 (two) times daily. Controller      revefenacin (YUPELRI) 175 mcg/3 mL Nebu Inhale 175 mcg into the lungs once daily at 6am. 30 each 6     No current facility-administered medications for this visit.     Last Chest xray 02/2022:  IMPRESSION:     Linear densities of the right lung base could reflect atelectasis or infiltrate  Review of Systems  General: Feeling Well.  Eyes: Vision is good.  ENT:  No sinusitis or pharyngitis.   Heart:: No chest pain or palpitations.  Lungs:dyspnea with any type of exertion   GI: No Nausea, vomiting, constipation, diarrhea, or reflux.  : No dysuria, hesitancy, or nocturia.  Musculoskeletal: chronic back pain   Skin: No lesions or rashes.  Neuro: No headaches or neuropathy.  Lymph: swelling to legs   Psych: No anxiety or depression.  Endo: No weight change.    OBJECTIVE:      BP (!) 170/80 (BP Location: Left arm, Patient Position: Sitting, BP Method: Medium (Manual))   Pulse 100   Ht 5' 9" (1.753 m)   Wt 83.9 kg (185 lb)   SpO2 (!) 94% Comment: 3LPM PD  BMI 27.32 kg/m²     Physical Exam  GENERAL: Older patient in no distress.  HEENT: Pupils equal and reactive. Extraocular movements intact. Nose intact.      Pharynx moist.  NECK: Supple.   HEART: Regular rate and rhythm. No murmur or gallop auscultated.  LUNGS: quiet   ABDOMEN: Bowel sounds present. Non-tender, no masses palpated.  EXTREMITIES: Normal muscle tone and joint movement, no cyanosis or clubbing. Walker   LYMPHATICS: No adenopathy palpated, 1+ pitting edema   SKIN: Dry, intact, no lesions.   NEURO: Cranial nerves II-XII intact. Motor strength 5/5 bilaterally, upper " and lower extremities.  PSYCH: Appropriate affect.    Assessment:       1. Chronic obstructive pulmonary disease, unspecified COPD type    2. Chronic hypoxemic respiratory failure          Plan:       Chronic obstructive pulmonary disease, unspecified COPD type    Chronic hypoxemic respiratory failure    Other orders  -     revefenacin (YUPELRI) 175 mcg/3 mL Nebu; Inhale 175 mcg into the lungs once daily at 6am.  Dispense: 30 each; Refill: 6         Follow up in about 6 months (around 9/29/2022).      Add Yupelri daily   Continue the brovana and Budesonide twice a day, rinse after you use it  Oxygen all the time

## 2022-04-04 ENCOUNTER — TELEPHONE (OUTPATIENT)
Dept: PULMONOLOGY | Facility: CLINIC | Age: 82
End: 2022-04-04
Payer: MEDICARE

## 2022-05-12 ENCOUNTER — TELEPHONE (OUTPATIENT)
Dept: PULMONOLOGY | Facility: CLINIC | Age: 82
End: 2022-05-12

## 2022-05-12 ENCOUNTER — HOSPITAL ENCOUNTER (OUTPATIENT)
Facility: HOSPITAL | Age: 82
Discharge: HOSPICE | End: 2022-05-13
Attending: EMERGENCY MEDICINE | Admitting: INTERNAL MEDICINE
Payer: MEDICARE

## 2022-05-12 DIAGNOSIS — J96.00 ACUTE RESPIRATORY FAILURE, UNSPECIFIED WHETHER WITH HYPOXIA OR HYPERCAPNIA: ICD-10-CM

## 2022-05-12 DIAGNOSIS — J44.1 COPD EXACERBATION: ICD-10-CM

## 2022-05-12 DIAGNOSIS — R09.02 HYPOXIA: ICD-10-CM

## 2022-05-12 DIAGNOSIS — R06.02 SOB (SHORTNESS OF BREATH): Primary | ICD-10-CM

## 2022-05-12 DIAGNOSIS — R06.02 SHORTNESS OF BREATH: ICD-10-CM

## 2022-05-12 DIAGNOSIS — U07.1 COVID-19: ICD-10-CM

## 2022-05-12 LAB
ALBUMIN SERPL BCP-MCNC: 4.3 G/DL (ref 3.5–5.2)
ALLENS TEST: ABNORMAL
ALP SERPL-CCNC: 42 U/L (ref 55–135)
ALT SERPL W/O P-5'-P-CCNC: 15 U/L (ref 10–44)
ANION GAP SERPL CALC-SCNC: 12 MMOL/L (ref 8–16)
AST SERPL-CCNC: 22 U/L (ref 10–40)
BASOPHILS # BLD AUTO: 0.01 K/UL (ref 0–0.2)
BASOPHILS NFR BLD: 0.1 % (ref 0–1.9)
BILIRUB SERPL-MCNC: 0.8 MG/DL (ref 0.1–1)
BNP SERPL-MCNC: 18 PG/ML (ref 0–99)
BUN SERPL-MCNC: 19 MG/DL (ref 8–23)
CALCIUM SERPL-MCNC: 9.6 MG/DL (ref 8.7–10.5)
CHLORIDE SERPL-SCNC: 94 MMOL/L (ref 95–110)
CK SERPL-CCNC: 75 U/L (ref 20–200)
CO2 SERPL-SCNC: 36 MMOL/L (ref 23–29)
CREAT SERPL-MCNC: 0.7 MG/DL (ref 0.5–1.4)
CRP SERPL-MCNC: 3.28 MG/DL
DELSYS: ABNORMAL
DIFFERENTIAL METHOD: ABNORMAL
EOSINOPHIL # BLD AUTO: 0.1 K/UL (ref 0–0.5)
EOSINOPHIL NFR BLD: 0.6 % (ref 0–8)
EP: 10
ERYTHROCYTE [DISTWIDTH] IN BLOOD BY AUTOMATED COUNT: 16.1 % (ref 11.5–14.5)
EST. GFR  (AFRICAN AMERICAN): >60 ML/MIN/1.73 M^2
EST. GFR  (NON AFRICAN AMERICAN): >60 ML/MIN/1.73 M^2
FERRITIN SERPL-MCNC: 149 NG/ML (ref 20–300)
FIO2: 30
GLUCOSE SERPL-MCNC: 152 MG/DL (ref 70–110)
GLUCOSE SERPL-MCNC: 161 MG/DL (ref 70–110)
HCO3 UR-SCNC: 41.9 MMOL/L (ref 24–28)
HCT VFR BLD AUTO: 32.5 % (ref 40–54)
HCT VFR BLD CALC: 28 %PCV (ref 36–54)
HGB BLD-MCNC: 10.3 G/DL (ref 14–18)
IMM GRANULOCYTES # BLD AUTO: 0.07 K/UL (ref 0–0.04)
IMM GRANULOCYTES NFR BLD AUTO: 0.5 % (ref 0–0.5)
IP: 14
LACTATE SERPL-SCNC: 1.8 MMOL/L (ref 0.5–1.9)
LACTATE SERPL-SCNC: 1.8 MMOL/L (ref 0.5–1.9)
LDH SERPL L TO P-CCNC: 146 U/L (ref 110–260)
LYMPHOCYTES # BLD AUTO: 1.9 K/UL (ref 1–4.8)
LYMPHOCYTES NFR BLD: 14.7 % (ref 18–48)
MAGNESIUM SERPL-MCNC: 1.8 MG/DL (ref 1.6–2.6)
MCH RBC QN AUTO: 29.7 PG (ref 27–31)
MCHC RBC AUTO-ENTMCNC: 31.7 G/DL (ref 32–36)
MCV RBC AUTO: 94 FL (ref 82–98)
MODE: ABNORMAL
MONOCYTES # BLD AUTO: 0.6 K/UL (ref 0.3–1)
MONOCYTES NFR BLD: 4.9 % (ref 4–15)
NEUTROPHILS # BLD AUTO: 10.2 K/UL (ref 1.8–7.7)
NEUTROPHILS NFR BLD: 79.2 % (ref 38–73)
NRBC BLD-RTO: 0 /100 WBC
PCO2 BLDA: 68.7 MMHG (ref 35–45)
PH SMN: 7.39 [PH] (ref 7.35–7.45)
PLATELET # BLD AUTO: 230 K/UL (ref 150–450)
PMV BLD AUTO: 8.7 FL (ref 9.2–12.9)
PO2 BLDA: 33 MMHG (ref 80–100)
POC BE: 17 MMOL/L
POC IONIZED CALCIUM: 1.19 MMOL/L (ref 1.06–1.42)
POC SATURATED O2: 59 % (ref 95–100)
POC TCO2: 44 MMOL/L (ref 23–27)
POTASSIUM BLD-SCNC: 3.9 MMOL/L (ref 3.5–5.1)
POTASSIUM SERPL-SCNC: 4.2 MMOL/L (ref 3.5–5.1)
PROCALCITONIN SERPL IA-MCNC: <0.05 NG/ML (ref 0–0.5)
PROT SERPL-MCNC: 7.7 G/DL (ref 6–8.4)
RBC # BLD AUTO: 3.47 M/UL (ref 4.6–6.2)
SAMPLE: ABNORMAL
SARS-COV-2 RDRP RESP QL NAA+PROBE: NEGATIVE
SITE: ABNORMAL
SODIUM BLD-SCNC: 138 MMOL/L (ref 136–145)
SODIUM SERPL-SCNC: 142 MMOL/L (ref 136–145)
SP02: 96
SPONT RATE: 24
TROPONIN I SERPL DL<=0.01 NG/ML-MCNC: <0.03 NG/ML
WBC # BLD AUTO: 12.89 K/UL (ref 3.9–12.7)

## 2022-05-12 PROCEDURE — 82728 ASSAY OF FERRITIN: CPT | Performed by: EMERGENCY MEDICINE

## 2022-05-12 PROCEDURE — 80053 COMPREHEN METABOLIC PANEL: CPT | Performed by: EMERGENCY MEDICINE

## 2022-05-12 PROCEDURE — 93005 ELECTROCARDIOGRAM TRACING: CPT | Performed by: SPECIALIST

## 2022-05-12 PROCEDURE — 99291 CRITICAL CARE FIRST HOUR: CPT

## 2022-05-12 PROCEDURE — 84295 ASSAY OF SERUM SODIUM: CPT

## 2022-05-12 PROCEDURE — 94761 N-INVAS EAR/PLS OXIMETRY MLT: CPT

## 2022-05-12 PROCEDURE — 94640 AIRWAY INHALATION TREATMENT: CPT

## 2022-05-12 PROCEDURE — 96365 THER/PROPH/DIAG IV INF INIT: CPT

## 2022-05-12 PROCEDURE — 84132 ASSAY OF SERUM POTASSIUM: CPT

## 2022-05-12 PROCEDURE — G0378 HOSPITAL OBSERVATION PER HR: HCPCS | Mod: CS

## 2022-05-12 PROCEDURE — 93005 ELECTROCARDIOGRAM TRACING: CPT | Performed by: INTERNAL MEDICINE

## 2022-05-12 PROCEDURE — 94799 UNLISTED PULMONARY SVC/PX: CPT

## 2022-05-12 PROCEDURE — 83615 LACTATE (LD) (LDH) ENZYME: CPT | Performed by: EMERGENCY MEDICINE

## 2022-05-12 PROCEDURE — 83880 ASSAY OF NATRIURETIC PEPTIDE: CPT | Performed by: EMERGENCY MEDICINE

## 2022-05-12 PROCEDURE — 99900031 HC PATIENT EDUCATION (STAT)

## 2022-05-12 PROCEDURE — 25000242 PHARM REV CODE 250 ALT 637 W/ HCPCS: Performed by: EMERGENCY MEDICINE

## 2022-05-12 PROCEDURE — 84145 PROCALCITONIN (PCT): CPT | Performed by: EMERGENCY MEDICINE

## 2022-05-12 PROCEDURE — 93010 EKG 12-LEAD: ICD-10-PCS | Mod: ,,, | Performed by: INTERNAL MEDICINE

## 2022-05-12 PROCEDURE — 85025 COMPLETE CBC W/AUTO DIFF WBC: CPT | Performed by: EMERGENCY MEDICINE

## 2022-05-12 PROCEDURE — 82803 BLOOD GASES ANY COMBINATION: CPT

## 2022-05-12 PROCEDURE — 96375 TX/PRO/DX INJ NEW DRUG ADDON: CPT

## 2022-05-12 PROCEDURE — 83735 ASSAY OF MAGNESIUM: CPT | Performed by: EMERGENCY MEDICINE

## 2022-05-12 PROCEDURE — 86140 C-REACTIVE PROTEIN: CPT | Performed by: EMERGENCY MEDICINE

## 2022-05-12 PROCEDURE — 87040 BLOOD CULTURE FOR BACTERIA: CPT | Mod: 59 | Performed by: EMERGENCY MEDICINE

## 2022-05-12 PROCEDURE — 82550 ASSAY OF CK (CPK): CPT | Performed by: EMERGENCY MEDICINE

## 2022-05-12 PROCEDURE — 27000221 HC OXYGEN, UP TO 24 HOURS

## 2022-05-12 PROCEDURE — 96367 TX/PROPH/DG ADDL SEQ IV INF: CPT

## 2022-05-12 PROCEDURE — 83605 ASSAY OF LACTIC ACID: CPT | Performed by: EMERGENCY MEDICINE

## 2022-05-12 PROCEDURE — 99900035 HC TECH TIME PER 15 MIN (STAT)

## 2022-05-12 PROCEDURE — 93010 EKG 12-LEAD: ICD-10-PCS | Mod: ,,, | Performed by: SPECIALIST

## 2022-05-12 PROCEDURE — 82330 ASSAY OF CALCIUM: CPT

## 2022-05-12 PROCEDURE — 25000003 PHARM REV CODE 250: Performed by: EMERGENCY MEDICINE

## 2022-05-12 PROCEDURE — 84484 ASSAY OF TROPONIN QUANT: CPT | Performed by: EMERGENCY MEDICINE

## 2022-05-12 PROCEDURE — 63600175 PHARM REV CODE 636 W HCPCS: Performed by: EMERGENCY MEDICINE

## 2022-05-12 PROCEDURE — 93010 ELECTROCARDIOGRAM REPORT: CPT | Mod: ,,, | Performed by: INTERNAL MEDICINE

## 2022-05-12 PROCEDURE — 93010 ELECTROCARDIOGRAM REPORT: CPT | Mod: ,,, | Performed by: SPECIALIST

## 2022-05-12 PROCEDURE — 85014 HEMATOCRIT: CPT

## 2022-05-12 PROCEDURE — G0378 HOSPITAL OBSERVATION PER HR: HCPCS

## 2022-05-12 PROCEDURE — U0002 COVID-19 LAB TEST NON-CDC: HCPCS | Performed by: EMERGENCY MEDICINE

## 2022-05-12 RX ORDER — MORPHINE SULFATE 4 MG/ML
4 INJECTION, SOLUTION INTRAMUSCULAR; INTRAVENOUS
Status: COMPLETED | OUTPATIENT
Start: 2022-05-12 | End: 2022-05-12

## 2022-05-12 RX ORDER — IPRATROPIUM BROMIDE AND ALBUTEROL SULFATE 2.5; .5 MG/3ML; MG/3ML
3 SOLUTION RESPIRATORY (INHALATION)
Status: DISPENSED | OUTPATIENT
Start: 2022-05-12 | End: 2022-05-12

## 2022-05-12 RX ORDER — METHYLPREDNISOLONE SOD SUCC 125 MG
125 VIAL (EA) INJECTION
Status: COMPLETED | OUTPATIENT
Start: 2022-05-12 | End: 2022-05-12

## 2022-05-12 RX ORDER — PREDNISONE 20 MG/1
TABLET ORAL
COMMUNITY
Start: 2022-02-18

## 2022-05-12 RX ORDER — METRONIDAZOLE 500 MG/100ML
500 INJECTION, SOLUTION INTRAVENOUS
Status: DISCONTINUED | OUTPATIENT
Start: 2022-05-12 | End: 2022-05-13

## 2022-05-12 RX ORDER — CEFEPIME HYDROCHLORIDE 1 G/50ML
1 INJECTION, SOLUTION INTRAVENOUS
Status: COMPLETED | OUTPATIENT
Start: 2022-05-12 | End: 2022-05-12

## 2022-05-12 RX ADMIN — IPRATROPIUM BROMIDE AND ALBUTEROL SULFATE 3 ML: .5; 3 SOLUTION RESPIRATORY (INHALATION) at 07:05

## 2022-05-12 RX ADMIN — VANCOMYCIN HYDROCHLORIDE 1500 MG: 1.5 INJECTION, POWDER, LYOPHILIZED, FOR SOLUTION INTRAVENOUS at 10:05

## 2022-05-12 RX ADMIN — MORPHINE SULFATE 4 MG: 4 INJECTION, SOLUTION INTRAMUSCULAR; INTRAVENOUS at 09:05

## 2022-05-12 RX ADMIN — CEFEPIME HYDROCHLORIDE 1 G: 1 INJECTION, SOLUTION INTRAVENOUS at 09:05

## 2022-05-12 RX ADMIN — METHYLPREDNISOLONE SODIUM SUCCINATE 125 MG: 125 INJECTION, POWDER, FOR SOLUTION INTRAMUSCULAR; INTRAVENOUS at 07:05

## 2022-05-12 NOTE — TELEPHONE ENCOUNTER
Patients wife called would like you to call her an speak with her about Sridhar. Wife says he is not getting around every good at all an a lot of times when you talk to him he seems out of it . She has a hard time getting around to help him without getting short of breath . She wants to talk about possible home health again

## 2022-05-13 VITALS
HEART RATE: 100 BPM | DIASTOLIC BLOOD PRESSURE: 58 MMHG | SYSTOLIC BLOOD PRESSURE: 135 MMHG | TEMPERATURE: 98 F | HEIGHT: 69 IN | WEIGHT: 180 LBS | BODY MASS INDEX: 26.66 KG/M2 | RESPIRATION RATE: 18 BRPM | OXYGEN SATURATION: 100 %

## 2022-05-13 PROBLEM — Z51.5 HOSPICE CARE: Status: ACTIVE | Noted: 2022-05-13

## 2022-05-13 PROCEDURE — 99900035 HC TECH TIME PER 15 MIN (STAT)

## 2022-05-13 PROCEDURE — 27000221 HC OXYGEN, UP TO 24 HOURS

## 2022-05-13 PROCEDURE — 25000242 PHARM REV CODE 250 ALT 637 W/ HCPCS: Performed by: INTERNAL MEDICINE

## 2022-05-13 PROCEDURE — 99900031 HC PATIENT EDUCATION (STAT)

## 2022-05-13 PROCEDURE — 94761 N-INVAS EAR/PLS OXIMETRY MLT: CPT

## 2022-05-13 PROCEDURE — 94640 AIRWAY INHALATION TREATMENT: CPT

## 2022-05-13 PROCEDURE — 25000003 PHARM REV CODE 250: Performed by: INTERNAL MEDICINE

## 2022-05-13 PROCEDURE — G0378 HOSPITAL OBSERVATION PER HR: HCPCS | Mod: CS

## 2022-05-13 PROCEDURE — G0378 HOSPITAL OBSERVATION PER HR: HCPCS

## 2022-05-13 RX ORDER — LISINOPRIL 10 MG/1
10 TABLET ORAL DAILY
Status: DISCONTINUED | OUTPATIENT
Start: 2022-05-13 | End: 2022-05-13 | Stop reason: HOSPADM

## 2022-05-13 RX ORDER — TALC
6 POWDER (GRAM) TOPICAL NIGHTLY PRN
Status: DISCONTINUED | OUTPATIENT
Start: 2022-05-13 | End: 2022-05-13 | Stop reason: HOSPADM

## 2022-05-13 RX ORDER — ARFORMOTEROL TARTRATE 15 UG/2ML
15 SOLUTION RESPIRATORY (INHALATION) 2 TIMES DAILY
Status: DISCONTINUED | OUTPATIENT
Start: 2022-05-13 | End: 2022-05-13 | Stop reason: HOSPADM

## 2022-05-13 RX ORDER — MORPHINE SULFATE 2 MG/ML
2 INJECTION, SOLUTION INTRAMUSCULAR; INTRAVENOUS EVERY 4 HOURS PRN
Status: DISCONTINUED | OUTPATIENT
Start: 2022-05-13 | End: 2022-05-13 | Stop reason: HOSPADM

## 2022-05-13 RX ORDER — ONDANSETRON 2 MG/ML
4 INJECTION INTRAMUSCULAR; INTRAVENOUS EVERY 8 HOURS PRN
Status: DISCONTINUED | OUTPATIENT
Start: 2022-05-13 | End: 2022-05-13 | Stop reason: HOSPADM

## 2022-05-13 RX ORDER — BUDESONIDE 0.5 MG/2ML
0.5 INHALANT ORAL 2 TIMES DAILY
Status: DISCONTINUED | OUTPATIENT
Start: 2022-05-13 | End: 2022-05-13 | Stop reason: HOSPADM

## 2022-05-13 RX ORDER — ALBUTEROL SULFATE 0.83 MG/ML
2.5 SOLUTION RESPIRATORY (INHALATION) EVERY 4 HOURS
Status: DISCONTINUED | OUTPATIENT
Start: 2022-05-13 | End: 2022-05-13

## 2022-05-13 RX ORDER — IPRATROPIUM BROMIDE AND ALBUTEROL SULFATE 2.5; .5 MG/3ML; MG/3ML
3 SOLUTION RESPIRATORY (INHALATION) EVERY 4 HOURS PRN
Status: DISCONTINUED | OUTPATIENT
Start: 2022-05-13 | End: 2022-05-13 | Stop reason: HOSPADM

## 2022-05-13 RX ORDER — BUDESONIDE 0.5 MG/2ML
0.5 INHALANT ORAL DAILY
Status: DISCONTINUED | OUTPATIENT
Start: 2022-05-13 | End: 2022-05-13

## 2022-05-13 RX ORDER — ENOXAPARIN SODIUM 100 MG/ML
40 INJECTION SUBCUTANEOUS EVERY 24 HOURS
Status: DISCONTINUED | OUTPATIENT
Start: 2022-05-13 | End: 2022-05-13 | Stop reason: HOSPADM

## 2022-05-13 RX ADMIN — ARFORMOTEROL TARTRATE 15 MCG: 15 SOLUTION RESPIRATORY (INHALATION) at 10:05

## 2022-05-13 RX ADMIN — LISINOPRIL 10 MG: 10 TABLET ORAL at 10:05

## 2022-05-13 RX ADMIN — BUDESONIDE 0.5 MG: 0.5 INHALANT RESPIRATORY (INHALATION) at 10:05

## 2022-05-13 NOTE — CARE UPDATE
05/12/22 1925   Patient Assessment/Suction   Level of Consciousness (AVPU) responds to voice   Respiratory Effort Unlabored;Shallow   Expansion/Accessory Muscles/Retractions no retractions;no use of accessory muscles   All Lung Fields Breath Sounds clear   LLL Breath Sounds crackles;diminished   RLL Breath Sounds crackles;diminished   Rhythm/Pattern, Respiratory assisted mechanically   PRE-TX-O2   SpO2 100 %   Pulse 100   Preset CPAP/BiPAP Settings   Mode Of Delivery BiPAP S/T

## 2022-05-13 NOTE — DISCHARGE SUMMARY
[facility]  Discharge Summary      Patient Name: Sridhar Florentino  MRN: [id number]  Patient Class: OP- Observation  Admission Date: 5/12/2022  Hospital Length of Stay: 0 days  Discharge Date and Time:  05/13/2022 2:48 PM  Attending Physician: Paula Jurado MD   Discharging Provider: Paula Jurado MD  Primary Care Provider: JASWANT Allen      HPI:   82 year old male with history of oxygen dependent COPD and has had 2/3 vaccinations for COVID presented to ED complaining of difficulty breathing. Initially the patient was placed on BiPAP, but then he decided he did not want any further intervention. Prior to presentation the patient could not walk more than a step or two without desaturating and gasping for air. He discussed his overall life situation with his son and his wife, and decided he wanted to be made Do Not Resuscitate status and to go home with hospice in AM. He wanted comfort measures only. His wishes will be followed. Consult to  placed      * No surgery found *      Hospital Course:    Case discussed with family which includes patient, his wife and daughter alongside  and nurse and as requested hospice at home was set up and patient was subsequently discharged.    Physical examination on discharge:  Constitutional: No distress.   HENT: NC  Head: Atraumatic.   Cardiovascular: Normal rate, regular rhythm and normal heart sounds.   Pulmonary/Chest: Effort normal. No wheezes.  On nasal oxygen   Abdominal: Soft. Bowel sounds are normal. No distension and no mass. No tenderness  Neurological: Alert.   Skin: Skin is warm and dry.   Psych: Appropriate mood and affect      Goals of Care Treatment Preferences:  Code Status: DNR      Consults:   Consults (From admission, onward)        Status Ordering Provider     Inpatient consult to Social Work/Case Management  Once        Provider:  (Not yet assigned)    Acknowledged WINDY GARCIA      Inpatient consult to Hospitalist  Once        Provider:  Parker Craven MD    Acknowledged JOSÉ MIGUEL FLAHERTY          * COPD exacerbation  Comfort measures, hospice as above        Final Active Diagnoses:    Diagnosis Date Noted POA    PRINCIPAL PROBLEM:  COPD exacerbation [J44.1] 05/12/2022 Yes    Hospice care [Z51.5] 05/13/2022 Not Applicable      Problems Resolved During this Admission:       Discharged Condition: stable    Disposition: Hospice/Home    Follow Up:   Contact information for after-discharge care     Home Medical Care     [facility] .    Service: Home Hospice  Contact information:  [address] [place] [address] [address]  [facility] [address]  [phone]                           Patient Instructions:      Activity as tolerated       Significant Diagnostic Studies: Labs:   CMP   Recent Labs   Lab 05/12/22 1926      K 4.2   CL 94*   CO2 36*   *   BUN 19   CREATININE 0.7   CALCIUM 9.6   PROT 7.7   ALBUMIN 4.3   BILITOT 0.8   ALKPHOS 42*   AST 22   ALT 15   ANIONGAP 12   ESTGFRAFRICA >60.0   EGFRNONAA >60.0   , CBC   Recent Labs   Lab 05/12/22 1926 05/12/22 2025   WBC 12.89*  --    HGB 10.3*  --    HCT 32.5* 28*     --     and All labs within the past 24 hours have been reviewed  Radiology: X-Ray: CXR: X-Ray Chest 1 View (CXR): No results found for this visit on 05/12/22. and X-Ray Chest PA and Lateral (CXR): No results found for this visit on 05/12/22.    Pending Diagnostic Studies:     None         Medications:  Reconciled Home Medications:      Medication List      CHANGE how you take these medications    * ipratropium-albuteroL  mcg/actuation inhaler  Commonly known as: CombiVENT  Inhale 1 puff into the lungs every 6 (six) hours as needed for Wheezing. Rescue  What changed: Another medication with the same name was changed. Make sure you understand how and when to take each.     * CombiVENT RESPIMAT  mcg/actuation inhaler  Generic drug:  ipratropium-albuteroL  USE 1 INHALATION EVERY 6 HOURS AS NEEDED FOR WHEEZING  What changed: See the new instructions.     lisinopriL 10 MG tablet  What changed: Another medication with the same name was removed. Continue taking this medication, and follow the directions you see here.         * This list has 2 medication(s) that are the same as other medications prescribed for you. Read the directions carefully, and ask your doctor or other care provider to review them with you.            CONTINUE taking these medications    albuterol 2.5 mg /3 mL (0.083 %) nebulizer solution  Commonly known as: PROVENTIL  Take 2.5 mg by nebulization every 6 (six) hours as needed.     arformoteroL 15 mcg/2 mL Nebu  Commonly known as: BROVANA  Take 15 mcg by nebulization 2 (two) times daily. Controller     budesonide 0.5 mg/2 mL nebulizer solution  Commonly known as: PULMICORT  Take 0.5 mg by nebulization once daily. Controller     fluocinolone 0.01 % external solution  Commonly known as: SYNALAR     furosemide 20 MG tablet  Commonly known as: LASIX  Take 20 mg by mouth 2 (two) times daily.     ketoconazole 2 % shampoo  Commonly known as: NIZORAL     potassium chloride 10 MEQ Cpsr  Commonly known as: MICRO-K  Take 10 mEq by mouth once daily.     * predniSONE 20 MG tablet  Commonly known as: DELTASONE     * predniSONE 2.5 MG tablet  Commonly known as: DELTASONE     YUPELRI 175 mcg/3 mL Nebu  Generic drug: revefenacin  Inhale 175 mcg into the lungs once daily at 6am.         * This list has 2 medication(s) that are the same as other medications prescribed for you. Read the directions carefully, and ask your doctor or other care provider to review them with you.                Indwelling Lines/Drains at time of discharge:   Lines/Drains/Airways     None                 Time spent on the discharge of patient: 30 minutes         Paula Jurado MD  Department of Hospital Medicine  [facility]

## 2022-05-13 NOTE — CARE UPDATE
05/12/22 1949   Patient Assessment/Suction   Level of Consciousness (AVPU) responds to voice   Respiratory Effort Unlabored   Expansion/Accessory Muscles/Retractions no retractions;no use of accessory muscles   LLL Breath Sounds crackles;diminished   RLL Breath Sounds diminished;crackles   Rhythm/Pattern, Respiratory assisted mechanically   PRE-TX-O2   O2 Device (Oxygen Therapy) BiPAP   Oxygen Concentration (%) 30   SpO2 97 %   Pulse 100   Resp (!) 29   Aerosol Therapy   $ Aerosol Therapy Charges Aerosol Treatment   Daily Review of Necessity (SVN) completed   Respiratory Treatment Status (SVN) given   Treatment Route (SVN) in-line   Patient Position (SVN) semi-Larios's   Post Treatment Assessment (SVN) breath sounds unchanged   Signs of Intolerance (SVN) none   Breath Sounds Post-Respiratory Treatment   Throughout All Fields Post-Treatment All Fields   Throughout All Fields Post-Treatment no change   Post-treatment Heart Rate (beats/min) 104   Post-treatment Resp Rate (breaths/min) 28   Ready to Wean/Extubation Screen   FIO2<=50 (chart decimal) 0.3   Education   $ Education Bronchodilator;15 min   Respiratory Evaluation   $ Care Plan Tech Time 15 min   $ Eval/Re-eval Charges Evaluation   Evaluation For New Orders

## 2022-05-13 NOTE — PLAN OF CARE
Patient cleared for discharge from case management standpoint.    Patient discharged with home hospice and does not need MD follow up appointment. [facility] will admit today when patient arrives at home. D/C orders faxed to hospice. Erika Reyez liaison confirmed receipt.    Chart and discharge orders reviewed.  Patient discharged home with no further case management needs.           05/13/22 1442   Final Note   Assessment Type Final Discharge Note   Anticipated Discharge Disposition HospiceHome   What phone number can be called within the next 1-3 days to see how you are doing after discharge? [phone]   Hospital Resources/Appts/Education Provided Provided patient/caregiver with written discharge plan information;Patient refused appointment set-up   Post-Acute Status   Post-Acute Authorization Hospice   Hospice Status Set-up Complete/Auth obtained   Discharge Delays None known at this time

## 2022-05-13 NOTE — CARE UPDATE
05/12/22 1937   Patient Assessment/Suction   Level of Consciousness (AVPU) responds to voice   Respiratory Effort Mild;Shallow   Expansion/Accessory Muscles/Retractions no retractions;no use of accessory muscles   LLL Breath Sounds crackles;diminished   RLL Breath Sounds diminished;crackles   Rhythm/Pattern, Respiratory assisted mechanically   PRE-TX-O2   O2 Device (Oxygen Therapy) BiPAP   $ Is the patient on Low Flow Oxygen? Yes   Oxygen Concentration (%) 30   SpO2 98 %   Pulse Oximetry Type Continuous   $ Pulse Oximetry - Multiple Charge Pulse Oximetry - Multiple   Pulse 100   Resp (!) 30   Respiratory Interventions   NPPV/CPAP Maintenance mask adjusted;mask secure   Ready to Wean/Extubation Screen   FIO2<=50 (chart decimal) 0.3   Preset CPAP/BiPAP Settings   Mode Of Delivery BiPAP S/T   $ Initial CPAP/BiPAP Setup? Yes   $ Is patient using? Yes   Equipment Type V60   Ipap 14   EPAP (cm H2O) 8   Pressure Support (cm H2O) 6   Set Rate (Breaths/Min) 16   ITime (sec) 1   Rise Time (sec) 0.1   Patient CPAP/BiPAP Settings   RR Total (Breaths/Min) 30   Tidal Volume (mL) 484   VE Minute Ventilation (L/min) 16 L/min   Peak Inspiratory Pressure (cm H2O) 156   TiTOT (%) 32   Total Leak (L/Min) 4   Patient Trigger - ST Mode Only (%) 88   CPAP/BiPAP Backup Settings   Backup Rate 16 breaths per minute (bpm)   Education   $ Education BiPAP;15 min   Respiratory Evaluation   $ Care Plan Tech Time 15 min   $ Eval/Re-eval Charges Evaluation   Evaluation For New Orders

## 2022-05-13 NOTE — ED NOTES
"HISTORIAN STATES, "HE JUST WASN'T ACTING LIKE HIMSELF. TROUBLE WITH HIS BREATHING. HE ALWAYS HAS TROUBLE WITH HIS BREATHING BUT IT WAS JUST NOTICEABLY WORSE." HISTORIAN REPORTS PT HAVING INCREASED SOB OVER THE PAST TWO TO THREE DAYS. NO ACUTE DISTRESS NOTED. STABLE CONDITION. FAMILY MEMBER AT BEDSIDE.   "

## 2022-05-13 NOTE — CARE UPDATE
05/12/22 2025   PRE-TX-O2   O2 Device (Oxygen Therapy) BiPAP   Oxygen Concentration (%) 30   SpO2 97 %   Pulse 104   Ready to Wean/Extubation Screen   FIO2<=50 (chart decimal) 0.3   Education   $ Education Other (see comment);15 min  (abg)   Labs   $ Was an ABG obtained? Venous Puncture;ISTAT - Blood gas;ISTAT - Calcium;ISTAT - Hematocrit;ISTAT - Potassium;ISTAT - Sodium   $ Labs Tech Time 30 min  (with ultra sound)   Critical Value Communication   Date Result Received 05/12/22   Time Result Received 2027   Resulting Department of Critical Value resp   Who communicated critical value from resulting department? dblack   Critical Test #1 pco2   Critical Test #1 Result 68.7   Critical Test #2 po2   Critical Test #2 Result 33   Name of Notified Physician/Designee md Susana   Date Notified 05/12/22   Time Notified 2028   Read Back Verification Yes   Respiratory Evaluation   $ Care Plan Tech Time 15 min   $ Eval/Re-eval Charges Evaluation

## 2022-05-13 NOTE — PLAN OF CARE
Medicare Outpatient Observation Notice was signed, explained and given to patient/caregiver on 05/13/2022 at 8:25am     addressed any questions or concerns.    Medicare Outpatient Observation Notice will be scanned into patient's medical record

## 2022-05-13 NOTE — H&P
[facility] - Emergency Dept  Hospital Medicine  History & Physical    Patient Name: Sridhar Florentino  MRN: [id number]  Patient Class: OP- Observation  Admission Date: 5/12/2022  Attending Physician: Parker Craven MD  Primary Care Provider: JASWANT Allen         Patient information was obtained from patient, relative(s) and ER records.     Subjective:     Principal Problem:<principal problem not specified>    Chief Complaint:   Chief Complaint   Patient presents with    Fatigue     It has been going on for two days and he is short of breath        HPI: 82 year old male with history of oxygen dependent COPD and has had 2/3 vaccinations for COVID presented to ED complaining of difficulty breathing. Initially the patient was placed on BiPAP, but then he decided he did not want any further intervention. Prior to presentation the patient could not walk more than a step or two without desaturating and gasping for air. He discussed his overall life situation with his son and his wife, and decided he wanted to be made Do Not Resuscitate status and to go home with hospice in AM. He wanted comfort measures only. His wishes will be followed. Consult to  placed      Past Medical History:   Diagnosis Date    Chronic back pain     COPD (chronic obstructive pulmonary disease)     Hypoxemia        Past Surgical History:   Procedure Laterality Date    bilateral cataracts      LUMBAR DISC SURGERY         Review of patient's allergies indicates:  No Known Allergies    No current facility-administered medications on file prior to encounter.     Current Outpatient Medications on File Prior to Encounter   Medication Sig    predniSONE (DELTASONE) 20 MG tablet     albuterol (PROVENTIL) 2.5 mg /3 mL (0.083 %) nebulizer solution Take 2.5 mg by nebulization every 6 (six) hours as needed.    arformoteroL (BROVANA) 15 mcg/2 mL Nebu Take 15 mcg by nebulization 2 (two) times daily. Controller     budesonide (PULMICORT) 0.5 mg/2 mL nebulizer solution Take 0.5 mg by nebulization once daily. Controller    COMBIVENT RESPIMAT  mcg/actuation inhaler USE 1 INHALATION EVERY 6 HOURS AS NEEDED FOR WHEEZING (Patient taking differently: Inhale 1 puff into the lungs every 6 (six) hours as needed.)    fluocinolone (SYNALAR) 0.01 % external solution     furosemide (LASIX) 20 MG tablet Take 20 mg by mouth 2 (two) times daily.    ipratropium-albuterol (COMBIVENT)  mcg/actuation inhaler Inhale 1 puff into the lungs every 6 (six) hours as needed for Wheezing. Rescue    ketoconazole (NIZORAL) 2 % shampoo     lisinopriL (PRINIVIL,ZESTRIL) 5 MG tablet Take 5 mg by mouth once daily.    lisinopriL 10 MG tablet     potassium chloride (MICRO-K) 10 MEQ CpSR Take 10 mEq by mouth once daily.    predniSONE (DELTASONE) 2.5 MG tablet     revefenacin (YUPELRI) 175 mcg/3 mL Nebu Inhale 175 mcg into the lungs once daily at 6am.     Family History       Problem Relation (Age of Onset)    Cancer Mother    Heart failure Father    Suicide Brother          Tobacco Use    Smoking status: Former Smoker     Packs/day: 1.00     Years: 40.00     Pack years: 40.00     Types: Cigarettes     Quit date:      Years since quittin.3    Smokeless tobacco: Never Used   Substance and Sexual Activity    Alcohol use: No    Drug use: No    Sexual activity: Never     Review of Systems   Constitutional:  Positive for fatigue.   HENT: Negative.     Eyes: Negative.    Respiratory:  Positive for shortness of breath.    Cardiovascular: Negative.    Gastrointestinal: Negative.    Endocrine: Negative.    Genitourinary: Negative.    Musculoskeletal: Negative.    Skin: Negative.    Allergic/Immunologic: Negative.    Neurological: Negative.    Hematological: Negative.    All other systems reviewed and are negative.  Objective:     Vital Signs (Most Recent):  Temp: 98.4 °F (36.9 °C) (22)  Pulse: 106 (225)  Resp: 18  (05/12/22 2140)  BP: (!) 165/69 (05/12/22 2145)  SpO2: 99 % (05/12/22 2145)   Vital Signs (24h Range):  Temp:  [98.4 °F (36.9 °C)] 98.4 °F (36.9 °C)  Pulse:  [] 106  Resp:  [18-30] 18  SpO2:  [82 %-100 %] 99 %  BP: (150-165)/(65-72) 165/69     Weight: 81.6 kg (180 lb)  Body mass index is 26.58 kg/m².    Physical Exam  Vitals and nursing note reviewed.   Constitutional:       Appearance: He is well-developed.   HENT:      Head: Normocephalic and atraumatic.      Right Ear: External ear normal.      Left Ear: External ear normal.      Nose: Nose normal.   Eyes:      Conjunctiva/sclera: Conjunctivae normal.      Pupils: Pupils are equal, round, and reactive to light.   Cardiovascular:      Rate and Rhythm: Normal rate and regular rhythm.      Heart sounds: Normal heart sounds.   Pulmonary:      Effort: Pulmonary effort is normal.      Breath sounds: Normal breath sounds.      Comments: Very decreased entry with scattered expiratory wheezes; no large airway sounds nor opening crepitations  Abdominal:      General: Bowel sounds are normal.      Palpations: Abdomen is soft.   Musculoskeletal:         General: Normal range of motion.      Cervical back: Normal range of motion and neck supple.   Skin:     General: Skin is warm and dry.      Capillary Refill: Capillary refill takes less than 2 seconds.   Neurological:      Mental Status: He is alert and oriented to person, place, and time.   Psychiatric:         Behavior: Behavior normal.         Thought Content: Thought content normal.         Judgment: Judgment normal.         CRANIAL NERVES     CN III, IV, VI   Pupils are equal, round, and reactive to light.     Significant Labs: All pertinent labs within the past 24 hours have been reviewed.  CBC:   Recent Labs   Lab 05/12/22 1926 05/12/22 2025   WBC 12.89*  --    HGB 10.3*  --    HCT 32.5* 28*     --      CMP:   Recent Labs   Lab 05/12/22 1926      K 4.2   CL 94*   CO2 36*   *   BUN 19    CREATININE 0.7   CALCIUM 9.6   PROT 7.7   ALBUMIN 4.3   BILITOT 0.8   ALKPHOS 42*   AST 22   ALT 15   ANIONGAP 12   EGFRNONAA >60.0     Cardiac Markers:   Recent Labs   Lab 05/12/22 1926   BNP 18     Troponin:   Recent Labs   Lab 05/12/22 1926   TROPONINI <0.030       Significant Imaging: I have reviewed all pertinent imaging results/findings within the past 24 hours.    Assessment/Plan:     COPD exacerbation  Comfort measures, hospice as above        VTE Risk Mitigation (From admission, onward)    None             Parker Craven MD  Department of Hospital Medicine   [facility] - Emergency Dept

## 2022-05-13 NOTE — PLAN OF CARE
Patient accepted by [facility]. Hospice arranging home hospice and consents.         05/13/22 1102   Discharge Reassessment   Assessment Type Discharge Planning Reassessment   Did the patient's condition or plan change since previous assessment? No   Discharge Plan discussed with: Spouse/sig other;Patient;Adult children   Post-Acute Status   Post-Acute Authorization Hospice   Hospice Status Referrals Sent   Discharge Delays (!) Post-Acute Set-up

## 2022-05-13 NOTE — CARE UPDATE
05/13/22 1007   Patient Assessment/Suction   Level of Consciousness (AVPU) alert   Respiratory Effort Normal   Expansion/Accessory Muscles/Retractions no use of accessory muscles   All Lung Fields Breath Sounds clear   Rhythm/Pattern, Respiratory no shortness of breath reported   Cough Frequency no cough   PRE-TX-O2   O2 Device (Oxygen Therapy) nasal cannula   $ Is the patient on Low Flow Oxygen? Yes   Flow (L/min) 4  (DECREASED TO 3L)   SpO2 100 %   Pulse Oximetry Type Intermittent   $ Pulse Oximetry - Multiple Charge Pulse Oximetry - Multiple   Pulse 90   Resp 18   Aerosol Therapy   $ Aerosol Therapy Charges Aerosol Treatment  (BUDESONIDE)   Daily Review of Necessity (SVN) completed   Respiratory Treatment Status (SVN) given   Treatment Route (SVN) mask;oxygen   Patient Position (SVN) sitting in chair   Post Treatment Assessment (SVN) breath sounds unchanged;vital signs unchanged   Signs of Intolerance (SVN) none   Education   $ Education Bronchodilator;15 min   Respiratory Evaluation   $ Care Plan Tech Time 15 min

## 2022-05-13 NOTE — PLAN OF CARE
[facility]  Initial Discharge Assessment       Primary Care Provider: JASWANT Allen    Admission Diagnosis: SOB (shortness of breath) [R06.02]    Admission Date: 5/12/2022  Expected Discharge Date: 5/13/2022    Discharge Barriers Identified: None     Initial assessment at bedside with patient and spouse. Patient and spouse plan to discharge with home hospice.    Payor: MEDICARE / Plan: MEDICARE PART A & B / Product Type: Government /     Extended Emergency Contact Information  Primary Emergency Contact: Ana Florentino  Home Phone: [phone]  Relation: Spouse  Preferred language: English   needed? No    Discharge Plan A: Hospice/home  Discharge Plan B: Hospice/home      [facility]  for [facility] [place] [address]  Phone: [phone] Fax: [phone]    [employer] [facility][id number] [address] & [address] [address]  Phone: [phone] Fax: [phone]    [facility] - [place], [place] - [address] [place] [address]  Phone: [phone] Fax: [phone]      Initial Assessment (most recent)     Adult Discharge Assessment - 05/13/22 0952        Discharge Assessment    Assessment Type Discharge Planning Assessment     Confirmed/corrected address, phone number and insurance Yes     Confirmed Demographics Correct on Facesheet     Source of Information family     When was your last doctors appointment? --   2022    Does patient/caregiver understand observation status Yes     Reason For Admission COPD exac     Lives With spouse     Facility Arrived From: Home     Do you expect to return to your current living situation? Yes     Do you have help at home or someone to help you manage your care at home? Yes     Who are your caregiver(s) and their phone number(s)? Ana (spouse) [phone]      Walking or Climbing Stairs Difficulty ambulation difficulty, requires equipment     Mobility Management walker     Dressing/Bathing Difficulty bathing difficulty, assistance 1 person     Do you have any problems with: Needs other help     Specify other help Ana (spouse) [phone]     Equipment Currently Used at Home oxygen;nebulizer     Readmission within 30 days? No     Patient currently being followed by outpatient case management? No     Do you currently have service(s) that help you manage your care at home? No     Do you take prescription medications? Yes     Do you have prescription coverage? Yes     Coverage      Do you have any problems affording any of your prescribed medications? No     How do you get to doctors appointments? family or friend will provide     Are you on dialysis? No     Do you take coumadin? No     Discharge Plan A Hospice/home     Discharge Plan B Hospice/home     Discharge Barriers Identified None        Relationship/Environment    Name(s) of Who Lives With Patient Ana (spouse) [phone]

## 2022-05-13 NOTE — HPI
82 year old male with history of oxygen dependent COPD and has had 2/3 vaccinations for COVID presented to ED complaining of difficulty breathing. Initially the patient was placed on BiPAP, but then he decided he did not want any further intervention. Prior to presentation the patient could not walk more than a step or two without desaturating and gasping for air. He discussed his overall life situation with his son and his wife, and decided he wanted to be made Do Not Resuscitate status and to go home with hospice in AM. He wanted comfort measures only. His wishes will be followed. Consult to  placed

## 2022-05-13 NOTE — H&P
[facility] - Emergency Dept  Hospital Medicine  History & Physical    Patient Name: Sridhar Florentino  MRN: [id number]  Patient Class: OP- Observation  Admission Date: 5/12/2022  Attending Physician: Parker Craven MD  Primary Care Provider: JASWANT Allen         Patient information was obtained from patient, relative(s) and ER records.     Subjective:     Principal Problem:<principal problem not specified>    Chief Complaint:   Chief Complaint   Patient presents with    Fatigue     It has been going on for two days and he is short of breath        HPI: 82 year old male with history of oxygen dependent COPD and has had 2/3 vaccinations for COVID presented to ED complaining of difficulty breathing. Initially the patient was placed on BiPAP, but then he decided he did not want any further intervention. Prior to presentation the patient could not walk more than a step or two without desaturating and gasping for air. He discussed his overall life situation with his son and his wife, and decided he wanted to be made Do Not Resuscitate status and to go home with hospice in AM. He wanted comfort measures only. His wishes will be followed. Consult to  placed      No new subjective & objective note has been filed under this hospital service since the last note was generated.    Assessment/Plan:     COPD exacerbation  Comfort measures, hospice as above        VTE Risk Mitigation (From admission, onward)    None             Parker Craven MD  Department of Hospital Medicine   [facility] Emergency Dept

## 2022-05-13 NOTE — SUBJECTIVE & OBJECTIVE
Past Medical History:   Diagnosis Date    Chronic back pain     COPD (chronic obstructive pulmonary disease)     Hypoxemia        Past Surgical History:   Procedure Laterality Date    bilateral cataracts      LUMBAR DISC SURGERY         Review of patient's allergies indicates:  No Known Allergies    No current facility-administered medications on file prior to encounter.     Current Outpatient Medications on File Prior to Encounter   Medication Sig    predniSONE (DELTASONE) 20 MG tablet     albuterol (PROVENTIL) 2.5 mg /3 mL (0.083 %) nebulizer solution Take 2.5 mg by nebulization every 6 (six) hours as needed.    arformoteroL (BROVANA) 15 mcg/2 mL Nebu Take 15 mcg by nebulization 2 (two) times daily. Controller    budesonide (PULMICORT) 0.5 mg/2 mL nebulizer solution Take 0.5 mg by nebulization once daily. Controller    COMBIVENT RESPIMAT  mcg/actuation inhaler USE 1 INHALATION EVERY 6 HOURS AS NEEDED FOR WHEEZING (Patient taking differently: Inhale 1 puff into the lungs every 6 (six) hours as needed.)    fluocinolone (SYNALAR) 0.01 % external solution     furosemide (LASIX) 20 MG tablet Take 20 mg by mouth 2 (two) times daily.    ipratropium-albuterol (COMBIVENT)  mcg/actuation inhaler Inhale 1 puff into the lungs every 6 (six) hours as needed for Wheezing. Rescue    ketoconazole (NIZORAL) 2 % shampoo     lisinopriL (PRINIVIL,ZESTRIL) 5 MG tablet Take 5 mg by mouth once daily.    lisinopriL 10 MG tablet     potassium chloride (MICRO-K) 10 MEQ CpSR Take 10 mEq by mouth once daily.    predniSONE (DELTASONE) 2.5 MG tablet     revefenacin (YUPELRI) 175 mcg/3 mL Nebu Inhale 175 mcg into the lungs once daily at 6am.     Family History       Problem Relation (Age of Onset)    Cancer Mother    Heart failure Father    Suicide Brother          Tobacco Use    Smoking status: Former Smoker     Packs/day: 1.00     Years: 40.00     Pack years: 40.00     Types: Cigarettes     Quit date: 2009     Years since quitting:  13.3    Smokeless tobacco: Never Used   Substance and Sexual Activity    Alcohol use: No    Drug use: No    Sexual activity: Never     Review of Systems   Constitutional:  Positive for fatigue.   HENT: Negative.     Eyes: Negative.    Respiratory:  Positive for shortness of breath.    Cardiovascular: Negative.    Gastrointestinal: Negative.    Endocrine: Negative.    Genitourinary: Negative.    Musculoskeletal: Negative.    Skin: Negative.    Allergic/Immunologic: Negative.    Neurological: Negative.    Hematological: Negative.    All other systems reviewed and are negative.  Objective:     Vital Signs (Most Recent):  Temp: 98.4 °F (36.9 °C) (05/12/22 1906)  Pulse: 106 (05/12/22 2145)  Resp: 18 (05/12/22 2140)  BP: (!) 165/69 (05/12/22 2145)  SpO2: 99 % (05/12/22 2145)   Vital Signs (24h Range):  Temp:  [98.4 °F (36.9 °C)] 98.4 °F (36.9 °C)  Pulse:  [] 106  Resp:  [18-30] 18  SpO2:  [82 %-100 %] 99 %  BP: (150-165)/(65-72) 165/69     Weight: 81.6 kg (180 lb)  Body mass index is 26.58 kg/m².    Physical Exam  Vitals and nursing note reviewed.   Constitutional:       Appearance: He is well-developed.   HENT:      Head: Normocephalic and atraumatic.      Right Ear: External ear normal.      Left Ear: External ear normal.      Nose: Nose normal.   Eyes:      Conjunctiva/sclera: Conjunctivae normal.      Pupils: Pupils are equal, round, and reactive to light.   Cardiovascular:      Rate and Rhythm: Normal rate and regular rhythm.      Heart sounds: Normal heart sounds.   Pulmonary:      Effort: Pulmonary effort is normal.      Breath sounds: Normal breath sounds.      Comments: Very decreased entry with scattered expiratory wheezes; no large airway sounds nor opening crepitations  Abdominal:      General: Bowel sounds are normal.      Palpations: Abdomen is soft.   Musculoskeletal:         General: Normal range of motion.      Cervical back: Normal range of motion and neck supple.   Skin:     General: Skin is warm  and dry.      Capillary Refill: Capillary refill takes less than 2 seconds.   Neurological:      Mental Status: He is alert and oriented to person, place, and time.   Psychiatric:         Behavior: Behavior normal.         Thought Content: Thought content normal.         Judgment: Judgment normal.         CRANIAL NERVES     CN III, IV, VI   Pupils are equal, round, and reactive to light.     Significant Labs: All pertinent labs within the past 24 hours have been reviewed.  CBC:   Recent Labs   Lab 05/12/22 1926 05/12/22 2025   WBC 12.89*  --    HGB 10.3*  --    HCT 32.5* 28*     --      CMP:   Recent Labs   Lab 05/12/22 1926      K 4.2   CL 94*   CO2 36*   *   BUN 19   CREATININE 0.7   CALCIUM 9.6   PROT 7.7   ALBUMIN 4.3   BILITOT 0.8   ALKPHOS 42*   AST 22   ALT 15   ANIONGAP 12   EGFRNONAA >60.0     Cardiac Markers:   Recent Labs   Lab 05/12/22 1926   BNP 18     Troponin:   Recent Labs   Lab 05/12/22 1926   TROPONINI <0.030       Significant Imaging: I have reviewed all pertinent imaging results/findings within the past 24 hours.

## 2022-05-17 LAB
BACTERIA BLD CULT: NORMAL
BACTERIA BLD CULT: NORMAL

## 2022-07-05 ENCOUNTER — TELEPHONE (OUTPATIENT)
Dept: PULMONOLOGY | Facility: CLINIC | Age: 82
End: 2022-07-05

## 2022-07-05 NOTE — TELEPHONE ENCOUNTER
Pt was dc'd from the hospital on hospice and he has no idea why and are very confused as to what to do.  He also needs combivent refilled please